# Patient Record
Sex: FEMALE | Race: WHITE | NOT HISPANIC OR LATINO | Employment: PART TIME | URBAN - METROPOLITAN AREA
[De-identification: names, ages, dates, MRNs, and addresses within clinical notes are randomized per-mention and may not be internally consistent; named-entity substitution may affect disease eponyms.]

---

## 2018-05-22 LAB
25(OH)D3 SERPL-MCNC: 22.9 NG/ML
ALBUMIN SERPL BCP-MCNC: 4.3 G/DL (ref 3.5–5.7)
ALP SERPL-CCNC: 75 IU/L (ref 40–150)
ALT SERPL W P-5'-P-CCNC: 14 IU/L (ref 0–50)
ANION GAP SERPL CALCULATED.3IONS-SCNC: 13.2 MM/L
AST SERPL W P-5'-P-CCNC: 13 U/L (ref 7–26)
BASOPHILS # BLD AUTO: 0.1 X3/UL (ref 0–0.3)
BASOPHILS # BLD AUTO: 0.8 % (ref 0–2)
BILIRUB SERPL-MCNC: 0.4 MG/DL (ref 0.3–1)
BUN SERPL-MCNC: 11 MG/DL (ref 7–25)
CALCIUM SERPL-MCNC: 9.5 MG/DL (ref 8.6–10.5)
CHLORIDE SERPL-SCNC: 102 MM/L (ref 98–107)
CO2 SERPL-SCNC: 27 MM/L (ref 21–31)
CREAT SERPL-MCNC: 0.87 MG/DL (ref 0.6–1.2)
DEPRECATED RDW RBC AUTO: 14.8 % (ref 11.5–14.5)
EGFR (HISTORICAL): > 60 GFR
EGFR AFRICAN AMERICAN (HISTORICAL): > 60 GFR
EOSINOPHIL # BLD AUTO: 0.4 X3/UL (ref 0–0.5)
EOSINOPHIL NFR BLD AUTO: 6.2 % (ref 0–5)
ERYTHROCYTE SEDIMENTATION RATE (HISTORICAL): 12 MM/HR
GLUCOSE (HISTORICAL): 90 MG/DL (ref 65–99)
HCT VFR BLD AUTO: 40.4 % (ref 37–47)
HGB BLD-MCNC: 13.3 G/DL (ref 12–16)
LYMPHOCYTES # BLD AUTO: 2.4 X3/UL (ref 1.2–4.2)
LYMPHOCYTES NFR BLD AUTO: 35.7 % (ref 20.5–51.1)
MCH RBC QN AUTO: 27 PG (ref 26–34)
MCHC RBC AUTO-ENTMCNC: 33.1 G/DL (ref 31–36)
MCV RBC AUTO: 81.8 FL (ref 81–99)
MONOCYTES # BLD AUTO: 0.4 X3/UL (ref 0–1)
MONOCYTES NFR BLD AUTO: 5.8 % (ref 1.7–12)
NEUTROPHILS # BLD AUTO: 3.5 X3/UL (ref 1.4–6.5)
NEUTS SEG NFR BLD AUTO: 51.5 % (ref 42.2–75.2)
OSMOLALITY, SERUM (HISTORICAL): 275 MOSM (ref 262–291)
PLATELET # BLD AUTO: 311 X3/UL (ref 130–400)
PMV BLD AUTO: 8.3 FL (ref 8.6–11.7)
POTASSIUM SERPL-SCNC: 4.2 MM/L (ref 3.5–5.5)
RBC # BLD AUTO: 4.93 X6/UL (ref 3.9–5.2)
SODIUM SERPL-SCNC: 138 MM/L (ref 134–143)
TOTAL PROTEIN (HISTORICAL): 7.1 G/DL (ref 6.4–8.9)
TSH SERPL DL<=0.05 MIU/L-ACNC: 2.21 UIU/M (ref 0.45–5.33)
VIT B12 SERPL-MCNC: 305 PG/ML (ref 180–914)
WBC # BLD AUTO: 6.8 X3/UL (ref 4.8–10.8)

## 2018-05-23 LAB
LYME AB IGM (HISTORICAL): <0.8 INDEX (ref 0–0.79)
LYME IGG/IGM AB  (HISTORICAL): <0.91 ISR (ref 0–0.9)

## 2018-09-21 ENCOUNTER — APPOINTMENT (OUTPATIENT)
Dept: LAB | Facility: HOSPITAL | Age: 33
End: 2018-09-21
Payer: COMMERCIAL

## 2018-09-21 ENCOUNTER — TRANSCRIBE ORDERS (OUTPATIENT)
Dept: ADMINISTRATIVE | Facility: HOSPITAL | Age: 33
End: 2018-09-21

## 2018-09-21 DIAGNOSIS — M35.00 SJOGREN'S SYNDROME, WITH UNSPECIFIED ORGAN INVOLVEMENT (HCC): ICD-10-CM

## 2018-09-21 DIAGNOSIS — M35.00 SJOGREN'S SYNDROME, WITH UNSPECIFIED ORGAN INVOLVEMENT (HCC): Primary | ICD-10-CM

## 2018-09-21 PROCEDURE — 36415 COLL VENOUS BLD VENIPUNCTURE: CPT

## 2018-09-21 PROCEDURE — 86235 NUCLEAR ANTIGEN ANTIBODY: CPT

## 2018-09-22 LAB
ENA SS-A AB SER-ACNC: <0.2 AI (ref 0–0.9)
ENA SS-B AB SER-ACNC: 1.4 AI (ref 0–0.9)

## 2019-01-24 ENCOUNTER — TELEPHONE (OUTPATIENT)
Dept: OBGYN CLINIC | Facility: HOSPITAL | Age: 34
End: 2019-01-24

## 2019-01-24 NOTE — TELEPHONE ENCOUNTER
Spoke with Popeye chavarria at Dr Nikita Carmichael office  She asked if patient I know if this patient refused to schedule  She said [place] said patient refused to schedule  I didn't see any documentation on this so she asked if I would contact patient again  I called patient and left a vm for her to call back to schedule with Dr Addie Pratt      If she calls and schedules or does not want to schedule please notify Dr Nikita Carmichael office @ [phone]

## 2019-01-24 NOTE — TELEPHONE ENCOUNTER
Patient did call back and we did schedule her an appt  With Dr Liana Owens on 8/5 at [place] [address] at [facility]  I called Allyn Delaney at Dr Keyonna Dodge office to confirm and let them know that patient does have an appt

## 2019-03-10 ENCOUNTER — APPOINTMENT (EMERGENCY)
Dept: CT IMAGING | Facility: HOSPITAL | Age: 34
End: 2019-03-10
Payer: COMMERCIAL

## 2019-03-10 ENCOUNTER — APPOINTMENT (EMERGENCY)
Dept: RADIOLOGY | Facility: HOSPITAL | Age: 34
End: 2019-03-10
Payer: COMMERCIAL

## 2019-03-10 ENCOUNTER — HOSPITAL ENCOUNTER (EMERGENCY)
Facility: HOSPITAL | Age: 34
Discharge: HOME | End: 2019-03-10
Attending: INTERNAL MEDICINE | Admitting: INTERNAL MEDICINE
Payer: COMMERCIAL

## 2019-03-10 VITALS
RESPIRATION RATE: 18 BRPM | DIASTOLIC BLOOD PRESSURE: 68 MMHG | SYSTOLIC BLOOD PRESSURE: 125 MMHG | TEMPERATURE: 97 F | HEART RATE: 66 BPM | BODY MASS INDEX: 38.45 KG/M2 | WEIGHT: 245 LBS | OXYGEN SATURATION: 97 % | HEIGHT: 67 IN

## 2019-03-10 DIAGNOSIS — M25.519 SHOULDER PAIN, ACUTE: ICD-10-CM

## 2019-03-10 DIAGNOSIS — M25.551 RIGHT HIP PAIN: ICD-10-CM

## 2019-03-10 DIAGNOSIS — S16.1XXA ACUTE STRAIN OF NECK MUSCLE, INITIAL ENCOUNTER: ICD-10-CM
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
## 2019-03-10 LAB — EXT PREG TEST URINE: NORMAL

## 2019-03-10 PROCEDURE — 73502 X-RAY EXAM HIP UNI 2-3 VIEWS: CPT

## 2019-03-10 PROCEDURE — 81025 URINE PREGNANCY TEST: CPT | Performed by: INTERNAL MEDICINE

## 2019-03-10 PROCEDURE — 70450 CT HEAD/BRAIN W/O DYE: CPT

## 2019-03-10 PROCEDURE — 99284 EMERGENCY DEPT VISIT MOD MDM: CPT

## 2019-03-10 PROCEDURE — 73030 X-RAY EXAM OF SHOULDER: CPT

## 2019-03-10 PROCEDURE — 72125 CT NECK SPINE W/O DYE: CPT

## 2019-03-10 RX ORDER — CLONAZEPAM 0.25 MG/1
0.25 TABLET, ORALLY DISINTEGRATING ORAL AS NEEDED
COMMUNITY
End: 2020-03-02 | Stop reason: SDUPTHER

## 2019-03-10 RX ORDER — OMEPRAZOLE 40 MG/1
40 CAPSULE, DELAYED RELEASE ORAL DAILY
COMMUNITY
Start: 2019-01-18 | End: 2020-03-02 | Stop reason: SDUPTHER

## 2019-03-10 RX ORDER — IBUPROFEN 600 MG/1
600 TABLET ORAL ONCE
Status: COMPLETED | OUTPATIENT
Start: 2019-03-10 | End: 2019-03-10

## 2019-03-10 RX ORDER — BUPROPION HYDROCHLORIDE 100 MG/1
100 TABLET ORAL DAILY
COMMUNITY
Start: 2019-01-18 | End: 2019-08-26 | Stop reason: SDUPTHER

## 2019-03-10 RX ORDER — HYDROXYCHLOROQUINE SULFATE 200 MG/1
200 TABLET, FILM COATED ORAL 2 TIMES DAILY WITH MEALS
COMMUNITY
Start: 2019-01-18 | End: 2020-03-16 | Stop reason: SDUPTHER

## 2019-03-10 RX ORDER — CYCLOBENZAPRINE HCL 10 MG
10 TABLET ORAL 2 TIMES DAILY PRN
Qty: 20 TABLET | Refills: 0 | Status: SHIPPED | OUTPATIENT
Start: 2019-03-10 | End: 2019-08-26 | Stop reason: ALTCHOICE

## 2019-03-10 RX ORDER — IBUPROFEN 600 MG/1
600 TABLET ORAL EVERY 6 HOURS PRN
Qty: 20 TABLET | Refills: 0 | Status: SHIPPED | OUTPATIENT
Start: 2019-03-10 | End: 2020-02-19 | Stop reason: ALTCHOICE

## 2019-03-10 RX ORDER — CYCLOBENZAPRINE HCL 10 MG
10 TABLET ORAL ONCE
Status: COMPLETED | OUTPATIENT
Start: 2019-03-10 | End: 2019-03-10

## 2019-03-10 RX ADMIN — IBUPROFEN 600 MG: 600 TABLET ORAL at 12:08

## 2019-03-10 RX ADMIN — CYCLOBENZAPRINE HYDROCHLORIDE 10 MG: 10 TABLET, FILM COATED ORAL at 12:08
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
## 2019-08-05 ENCOUNTER — OFFICE VISIT (OUTPATIENT)
Dept: RHEUMATOLOGY | Facility: CLINIC | Age: 34
End: 2019-08-05

## 2019-08-05 VITALS
WEIGHT: 246 LBS | HEART RATE: 70 BPM | HEIGHT: 67 IN | DIASTOLIC BLOOD PRESSURE: 76 MMHG | SYSTOLIC BLOOD PRESSURE: 128 MMHG | BODY MASS INDEX: 38.61 KG/M2

## 2019-08-05 DIAGNOSIS — M54.50 CHRONIC BILATERAL LOW BACK PAIN WITHOUT SCIATICA: ICD-10-CM

## 2019-08-05 DIAGNOSIS — M79.7 FIBROMYALGIA: ICD-10-CM

## 2019-08-05 DIAGNOSIS — M79.7 FIBROMYALGIA: Primary | ICD-10-CM

## 2019-08-05 DIAGNOSIS — M79.10 MYALGIA: ICD-10-CM

## 2019-08-05 DIAGNOSIS — M35.00 SICCA SYNDROME (HCC): ICD-10-CM

## 2019-08-05 DIAGNOSIS — G89.29 CHRONIC BILATERAL LOW BACK PAIN WITHOUT SCIATICA: ICD-10-CM

## 2019-08-05 DIAGNOSIS — M35.01 SJOGREN'S SYNDROME WITH KERATOCONJUNCTIVITIS SICCA (HCC): Primary | ICD-10-CM

## 2019-08-05 DIAGNOSIS — M25.50 POLYARTHRALGIA: ICD-10-CM

## 2019-08-05 PROCEDURE — 99204 OFFICE O/P NEW MOD 45 MIN: CPT | Performed by: INTERNAL MEDICINE

## 2019-08-05 RX ORDER — GABAPENTIN 100 MG/1
CAPSULE ORAL
Qty: 90 CAPSULE | Refills: 2 | Status: SHIPPED | OUTPATIENT
Start: 2019-08-05 | End: 2019-11-11 | Stop reason: ALTCHOICE

## 2019-08-05 NOTE — PROGRESS NOTES
Assessment and Plan:   Patient is a 70-year-old female who presents to establish rheumatology care for Sjogren syndrome and also has fibromyalgia  The only records available for review are a positive SSB antibody of low titer from 1 year ago  She saw other rheumatologist but we do not have these records for review  We discussed today that we will do a complete rheumatologic workup to rule out other underlying diseases  Her SSB was very borderline positive and so we will also repeat this  She does report that after starting Plaquenil she actually did feel better in terms of some of her joint pain in her general fatigue  It seems she has had some sort of response to the Plaquenil and so we will continue with it  She did recently have her updated eye exam 1 month ago and is aware to go annually  She is also already on the Restasis eyedrops which have definitely helped her as well  For her fibromyalgia, she does have clear evidence of this on exam and I did discuss with her that the mainstay of treatment is physical exercise and therapy  She seemed not very motivated for these things and I did offer to refer her for aqua therapy, but she is having transportation issues at this time  This is an option in the future when she is able to go  I did encourage her to do exercise on her own though  I added gabapentin to titrate up to 100 mg t i d , which should be okay with her other medications  She states that her family doctor was hoping we would manage her fibromyalgia and I discussed with her that we manage it to an extent when we are seeing a patient for other reasons such as Sjogren's  However if she would be a future candidate for something like Cymbalta or Savella, I would defer to her family doctor since she is already on Wellbutrin since she would need to be weaned off the Wellbutrin and transitioned over to 1 of the dose    For now since we are able to add the gabapentin we will do this and titrated up as tolerated  Plan:  Diagnoses and all orders for this visit:    Sjogren's syndrome with keratoconjunctivitis sicca (HCC)    Sicca syndrome ([facility] [address] )  -     EKATERINA Screen w/ Reflex to Titer/Pattern  -     Anti-DNA antibody, double-stranded  -     C3 complement  -     C4 complement  -     CBC and differential  -     Comprehensive metabolic panel  -     C-reactive protein  -     Cyclic citrul peptide antibody, IgG  -     Sjogren's Antibodies  -     RF Screen w/ Reflex to Titer; Future  -     Sedimentation rate, automated  -     Nuclear antigen antibody  -     Chronic Hepatitis Panel  -     Vitamin D 25 hydroxy  -     TSH, 3rd generation with Free T4 reflex    Fibromyalgia  -     EKATERINA Screen w/ Reflex to Titer/Pattern  -     Anti-DNA antibody, double-stranded  -     C3 complement  -     C4 complement  -     CBC and differential  -     Comprehensive metabolic panel  -     C-reactive protein  -     Cyclic citrul peptide antibody, IgG  -     Sjogren's Antibodies  -     RF Screen w/ Reflex to Titer; Future  -     Sedimentation rate, automated  -     Nuclear antigen antibody  -     Chronic Hepatitis Panel  -     Vitamin D 25 hydroxy  -     TSH, 3rd generation with Free T4 reflex  -     gabapentin (NEURONTIN) 100 mg capsule; 1 tab qhs x1 week, then 1 tab bid x1 week, then 1 tab tid thereafter    Myalgia    Polyarthralgia    Chronic bilateral low back pain without sciatica  -     XR spine lumbar minimum 4 views non injury; Future  -     XR sacroiliac joints 3+ views; Future        Follow-up plan: 3 months with ALEXANDREA Cotton  Eldon Cortez is a 34 y o   female with GERD, anxiety, depression, endometriosis who presents for rheumatology consult by request of Dr Paul Sotomayor for Sjogren's syndrome  She previously saw Dr Jaylon Montemayor who diagnosed her with Sjogren's and started her on plaquenil around 2016     She previously saw [facility] with Dr Lyndsey Rizo in 2017 but I do not have those records available for review  She was noted to have a positive SSB by her PCP in September 2018 and she has been on Plaquenil since around 2016  She denies any other medication she has taken in the past for autoimmune disease  She recalls that when she started Plaquenil she definitely felt improvement in joint pain and fatigue  +Dry eyes and dry mouth, she is doing better on Restasis eye drops  Purple color change of her fingers in the cold, no white or red  She previously had issues with recurrent ulcers in her mouth but then determined it was related to particular foods which she now avoids and she no longer gets the ulcers  She also has fibromyalgia but has never been on any particular medications for this  She has not done any physical or aqua therapy for this  She states that currently she has limited transportation as her and her  are sharing 1 car  She takes 2 OTC ibuprofen BID most days of the week  She avoids taking it when she is off from work  She will primarily take it for lower back and bilateral leg pain  She feels she gets pain radiating from her lower back into both of her legs  She only has about 5 minutes of morning stiffness all over and particularly in her back  She does not have any joint swelling  Denies photosensitivity, rashes, psoriasis, oral or nasal ulcers, alopecia, Raynaud's, h/o pericarditis or pleurisy, h/o blood clots or miscarriages  For her Plaquenil monitoring she just had an updated eye exam 1 month ago and she reports it was unremarkable  Review of Systems  Review of Systems   Constitutional: Negative for chills, fatigue, fever and unexpected weight change  HENT: Negative for mouth sores and trouble swallowing  +dry m Ben Honey   Eyes: Negative for pain and visual disturbance  +dry eyes   Respiratory: Negative for cough and shortness of breath  Cardiovascular: Negative for chest pain and leg swelling     Gastrointestinal: Negative for abdominal pain, blood in stool, constipation, diarrhea and nausea  Genitourinary: Negative for hematuria  Musculoskeletal: Positive for arthralgias and myalgias  Negative for back pain and joint swelling  Skin: Positive for color change (puple fingers in the cold)  Negative for rash  Neurological: Negative for weakness and numbness  Hematological: Negative for adenopathy  Psychiatric/Behavioral: Negative for sleep disturbance  Allergies  Allergies   Allergen Reactions    Amoxicillin Hives       Home Medications    Current Outpatient Medications:     buPROPion (WELLBUTRIN) 100 mg tablet, , Disp: , Rfl:     clonazePAM (KlonoPIN) 0 5 mg tablet, Take 0 25 mg by mouth 2 (two) times a day as needed for anxiety, Disp: , Rfl:     cyclobenzaprine (FLEXERIL) 10 mg tablet, Take 1 tablet (10 mg total) by mouth 2 (two) times a day as needed for muscle spasms, Disp: 20 tablet, Rfl: 0    hydroxychloroquine (PLAQUENIL) 200 mg tablet, , Disp: , Rfl:     ibuprofen (MOTRIN) 600 mg tablet, Take 1 tablet (600 mg total) by mouth every 6 (six) hours as needed for mild pain or moderate pain for up to 20 doses, Disp: 20 tablet, Rfl: 0    omeprazole (PriLOSEC) 40 MG capsule, , Disp: , Rfl:     gabapentin (NEURONTIN) 100 mg capsule, 1 tab qhs x1 week, then 1 tab bid x1 week, then 1 tab tid thereafter, Disp: 90 capsule, Rfl: 2    Past Medical History  Past Medical History:   Diagnosis Date    Fibromyalgia     Sjogren's syndrome ([facility] [address] )        Past Surgical History   History reviewed  No pertinent surgical history  Family History  +RA in her father   History reviewed  No pertinent family history      Social History  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   Social History     Substance and Sexual Activity   Alcohol Use Yes    Frequency: Monthly or less    Drinks per session: 1 or 2     Social History     Substance and Sexual Activity   Drug Use Never     Social History     Tobacco Use   Smoking Status Never Smoker Smokeless Tobacco Never Used       Objective:    Vitals:    08/05/19 1020   BP: 128/76   Pulse: 70   Weight: 112 kg (246 lb)   Height: 5' 7" (1 702 m)       Physical Exam   Constitutional: She appears well-developed and well-nourished  No distress  Obese    HENT:   Head: Normocephalic and atraumatic  Mouth/Throat: Oropharynx is clear and moist and mucous membranes are normal    Eyes: Conjunctivae and EOM are normal  No scleral icterus  Neck: Neck supple  No spinous process tenderness and no muscular tenderness present  No thyromegaly present  Cardiovascular: Normal rate, regular rhythm, S1 normal and S2 normal  Exam reveals no friction rub  No murmur heard  Pulmonary/Chest: Effort normal and breath sounds normal  No respiratory distress  She has no wheezes  She has no rhonchi  She has no rales  Abdominal: Soft  She exhibits no distension  There is no hepatosplenomegaly  There is no tenderness  Musculoskeletal:   Diffuse soft tissue tenderness with 18/18 fibromyalgia tender points present  Peripheral joints have no swelling or synovitis  Lymphadenopathy:     She has no cervical adenopathy  Neurological: She is alert  She has normal strength  No sensory deficit  Skin: Skin is warm and dry  No rash noted  Nails show no clubbing  Psychiatric: She has a normal mood and affect  Imaging:   XR hips 3/10/19: IMPRESSION:  No acute osseous abnormality  XR right shoulder 3/10/19: IMPRESSION:  No acute osseous abnormality  MRI cervical 5/2018: IMPRESSION:   Mild degenerative changes of the cervical spine are identified  There is no central canal stenosis appreciated  No abnormal enhancement is identified following the administration of intravenous gadolinium          Labs:   Component      Latest Ref Rng & Units 9/21/2018   SSA (RO) ANTIBODY      0 0 - 0 9 AI <0 2   SSB (LA) ANTIBODY      0 0 - 0 9 AI 1 4 (H)

## 2019-08-12 ENCOUNTER — APPOINTMENT (OUTPATIENT)
Dept: LAB | Facility: HOSPITAL | Age: 34
End: 2019-08-12
Payer: COMMERCIAL

## 2019-08-12 ENCOUNTER — TRANSCRIBE ORDERS (OUTPATIENT)
Dept: ADMINISTRATIVE | Facility: HOSPITAL | Age: 34
End: 2019-08-12

## 2019-08-12 ENCOUNTER — HOSPITAL ENCOUNTER (OUTPATIENT)
Dept: RADIOLOGY | Facility: HOSPITAL | Age: 34
Discharge: HOME | End: 2019-08-12
Payer: COMMERCIAL

## 2019-08-12 DIAGNOSIS — M54.50 CHRONIC BILATERAL LOW BACK PAIN WITHOUT SCIATICA: ICD-10-CM

## 2019-08-12 DIAGNOSIS — M35.00 SICCA SYNDROME (HCC): ICD-10-CM

## 2019-08-12 DIAGNOSIS — M79.7 FIBROMYALGIA: ICD-10-CM

## 2019-08-12 DIAGNOSIS — G89.29 CHRONIC BILATERAL LOW BACK PAIN WITHOUT SCIATICA: ICD-10-CM

## 2019-08-12 LAB
25(OH)D3 SERPL-MCNC: 20.8 NG/ML (ref 30–100)
ALBUMIN SERPL BCP-MCNC: 4.1 G/DL (ref 3.5–5.7)
ALP SERPL-CCNC: 76 U/L (ref 40–150)
ALT SERPL W P-5'-P-CCNC: 17 U/L (ref 7–52)
ANION GAP SERPL CALCULATED.3IONS-SCNC: 8 MMOL/L (ref 4–13)
AST SERPL W P-5'-P-CCNC: 16 U/L (ref 13–39)
BASOPHILS # BLD AUTO: 0.1 THOUSANDS/ΜL (ref 0–0.1)
BASOPHILS NFR BLD AUTO: 1 % (ref 0–2)
BILIRUB SERPL-MCNC: 0.2 MG/DL (ref 0.2–1)
BUN SERPL-MCNC: 18 MG/DL (ref 7–25)
C3 SERPL-MCNC: 130 MG/DL (ref 90–180)
C4 SERPL-MCNC: 22 MG/DL (ref 10–40)
CALCIUM SERPL-MCNC: 9.5 MG/DL (ref 8.6–10.5)
CHLORIDE SERPL-SCNC: 104 MMOL/L (ref 98–107)
CO2 SERPL-SCNC: 27 MMOL/L (ref 21–31)
CREAT SERPL-MCNC: 1.02 MG/DL (ref 0.6–1.2)
CRP SERPL QL: 5.4 MG/L
EOSINOPHIL # BLD AUTO: 0.7 THOUSAND/ΜL (ref 0–0.61)
EOSINOPHIL NFR BLD AUTO: 8 % (ref 0–5)
ERYTHROCYTE [DISTWIDTH] IN BLOOD BY AUTOMATED COUNT: 15.7 % (ref 11.5–14.5)
ERYTHROCYTE [SEDIMENTATION RATE] IN BLOOD: 19 MM/HOUR (ref 0–20)
GFR SERPL CREATININE-BSD FRML MDRD: 73 ML/MIN/1.73SQ M
GLUCOSE SERPL-MCNC: 88 MG/DL (ref 65–99)
HBV CORE AB SER QL: NORMAL
HBV CORE IGM SER QL: NORMAL
HBV SURFACE AG SER QL: NORMAL
HCT VFR BLD AUTO: 37.6 % (ref 42–47)
HCV AB SER QL: NORMAL
HGB BLD-MCNC: 12.4 G/DL (ref 12–16)
LYMPHOCYTES # BLD AUTO: 3.6 THOUSANDS/ΜL (ref 0.6–4.47)
LYMPHOCYTES NFR BLD AUTO: 43 % (ref 21–51)
MCH RBC QN AUTO: 27.2 PG (ref 26–34)
MCHC RBC AUTO-ENTMCNC: 33.1 G/DL (ref 31–37)
MCV RBC AUTO: 82 FL (ref 81–99)
MONOCYTES # BLD AUTO: 0.5 THOUSAND/ΜL (ref 0.17–1.22)
MONOCYTES NFR BLD AUTO: 6 % (ref 2–12)
NEUTROPHILS # BLD AUTO: 3.6 THOUSANDS/ΜL (ref 1.4–6.5)
NEUTS SEG NFR BLD AUTO: 43 % (ref 42–75)
PLATELET # BLD AUTO: 283 THOUSANDS/UL (ref 149–390)
PMV BLD AUTO: 8.5 FL (ref 8.6–11.7)
POTASSIUM SERPL-SCNC: 3.7 MMOL/L (ref 3.5–5.5)
PROT SERPL-MCNC: 6.8 G/DL (ref 6.4–8.9)
RBC # BLD AUTO: 4.57 MILLION/UL (ref 3.9–5.2)
SODIUM SERPL-SCNC: 139 MMOL/L (ref 134–143)
TSH SERPL DL<=0.05 MIU/L-ACNC: 5.05 UIU/ML (ref 0.45–5.33)
WBC # BLD AUTO: 8.5 THOUSAND/UL (ref 4.8–10.8)

## 2019-08-12 PROCEDURE — 86160 COMPLEMENT ANTIGEN: CPT | Performed by: INTERNAL MEDICINE

## 2019-08-12 PROCEDURE — 36415 COLL VENOUS BLD VENIPUNCTURE: CPT | Performed by: INTERNAL MEDICINE

## 2019-08-12 PROCEDURE — 86038 ANTINUCLEAR ANTIBODIES: CPT | Performed by: INTERNAL MEDICINE

## 2019-08-12 PROCEDURE — 86705 HEP B CORE ANTIBODY IGM: CPT | Performed by: INTERNAL MEDICINE

## 2019-08-12 PROCEDURE — 86704 HEP B CORE ANTIBODY TOTAL: CPT | Performed by: INTERNAL MEDICINE

## 2019-08-12 PROCEDURE — 84443 ASSAY THYROID STIM HORMONE: CPT | Performed by: INTERNAL MEDICINE

## 2019-08-12 PROCEDURE — 72200 X-RAY EXAM SI JOINTS: CPT

## 2019-08-12 PROCEDURE — 86200 CCP ANTIBODY: CPT | Performed by: INTERNAL MEDICINE

## 2019-08-12 PROCEDURE — 80053 COMPREHEN METABOLIC PANEL: CPT | Performed by: INTERNAL MEDICINE

## 2019-08-12 PROCEDURE — 87340 HEPATITIS B SURFACE AG IA: CPT | Performed by: INTERNAL MEDICINE

## 2019-08-12 PROCEDURE — 85025 COMPLETE CBC W/AUTO DIFF WBC: CPT | Performed by: INTERNAL MEDICINE

## 2019-08-12 PROCEDURE — 85652 RBC SED RATE AUTOMATED: CPT | Performed by: INTERNAL MEDICINE

## 2019-08-12 PROCEDURE — 86235 NUCLEAR ANTIGEN ANTIBODY: CPT | Performed by: INTERNAL MEDICINE

## 2019-08-12 PROCEDURE — 86140 C-REACTIVE PROTEIN: CPT | Performed by: INTERNAL MEDICINE

## 2019-08-12 PROCEDURE — 72110 X-RAY EXAM L-2 SPINE 4/>VWS: CPT

## 2019-08-12 PROCEDURE — 86430 RHEUMATOID FACTOR TEST QUAL: CPT

## 2019-08-12 PROCEDURE — 86225 DNA ANTIBODY NATIVE: CPT | Performed by: INTERNAL MEDICINE

## 2019-08-12 PROCEDURE — 86803 HEPATITIS C AB TEST: CPT | Performed by: INTERNAL MEDICINE

## 2019-08-12 PROCEDURE — 82306 VITAMIN D 25 HYDROXY: CPT | Performed by: INTERNAL MEDICINE

## 2019-08-13 LAB
CCP IGA+IGG SERPL IA-ACNC: 6 UNITS (ref 0–19)
DSDNA AB SER-ACNC: <1 IU/ML (ref 0–9)
ENA RNP AB SER-ACNC: <0.2 AI (ref 0–0.9)
ENA SM AB SER-ACNC: <0.2 AI (ref 0–0.9)
ENA SS-A AB SER-ACNC: <0.2 AI (ref 0–0.9)
ENA SS-B AB SER-ACNC: 1.4 AI (ref 0–0.9)
RHEUMATOID FACT SER QL LA: NEGATIVE

## 2019-08-14 LAB — RYE IGE QN: NEGATIVE

## 2019-08-21 RX ORDER — BUPROPION HYDROCHLORIDE 100 MG/1
100 TABLET ORAL DAILY
Qty: 30 TABLET | Refills: 5 | OUTPATIENT
Start: 2019-08-21

## 2019-08-26 ENCOUNTER — OFFICE VISIT (OUTPATIENT)
Dept: FAMILY MEDICINE CLINIC | Facility: CLINIC | Age: 34
End: 2019-08-26
Payer: COMMERCIAL

## 2019-08-26 VITALS
HEART RATE: 73 BPM | OXYGEN SATURATION: 98 % | BODY MASS INDEX: 42.12 KG/M2 | HEIGHT: 64 IN | WEIGHT: 246.7 LBS | SYSTOLIC BLOOD PRESSURE: 130 MMHG | DIASTOLIC BLOOD PRESSURE: 80 MMHG

## 2019-08-26 DIAGNOSIS — M35.01 SJOGREN'S SYNDROME WITH KERATOCONJUNCTIVITIS SICCA (HCC): ICD-10-CM

## 2019-08-26 DIAGNOSIS — F32.0 MAJOR DEPRESSIVE DISORDER, SINGLE EPISODE, MILD (HCC): Primary | ICD-10-CM

## 2019-08-26 DIAGNOSIS — M79.7 FIBROMYALGIA: ICD-10-CM

## 2019-08-26 PROCEDURE — 3008F BODY MASS INDEX DOCD: CPT | Performed by: FAMILY MEDICINE

## 2019-08-26 PROCEDURE — 99213 OFFICE O/P EST LOW 20 MIN: CPT | Performed by: FAMILY MEDICINE

## 2019-08-26 PROCEDURE — 1036F TOBACCO NON-USER: CPT | Performed by: FAMILY MEDICINE

## 2019-08-26 RX ORDER — NORGESTIMATE AND ETHINYL ESTRADIOL 0.25-0.035
1 KIT ORAL DAILY
COMMUNITY
End: 2019-08-26 | Stop reason: ALTCHOICE

## 2019-08-26 RX ORDER — SERTRALINE HYDROCHLORIDE 100 MG/1
100 TABLET, FILM COATED ORAL
COMMUNITY
End: 2020-03-02 | Stop reason: SDUPTHER

## 2019-08-26 RX ORDER — BUPROPION HYDROCHLORIDE 100 MG/1
100 TABLET ORAL DAILY
Qty: 90 TABLET | Refills: 3 | Status: SHIPPED | OUTPATIENT
Start: 2019-08-26 | End: 2020-03-02 | Stop reason: SDUPTHER

## 2019-08-26 NOTE — PROGRESS NOTES
Assessment/Plan:    Sjogren's syndrome with keratoconjunctivitis sicca ([facility] [address] )  I reviewed the patient's most recent blood work with her  I also reviewed her medications with her and updated her chart  I discussed weight loss, which I think will likely help her back pain and joint pain  I counseled her regarding exercise and weight loss  I also referred the patient to a dietitian  Major depressive disorder, single episode, mild ([facility] [address] )  Patient's depression is currently stable  She had seen psychiatrist in the past who had started her on this regiment  Today, I refilled the patient's bupropion  Diagnoses and all orders for this visit:    Major depressive disorder, single episode, mild ([facility] [address] )  -     Ambulatory referral to Nutrition Services; Future  -     buPROPion (WELLBUTRIN) 100 mg tablet; Take 1 tablet (100 mg total) by mouth daily    Fibromyalgia    Sjogren's syndrome with keratoconjunctivitis sicca (HCC)    Other orders  -     sertraline (ZOLOFT) 100 mg tablet; Take 100 mg by mouth daily at bedtime  -     Discontinue: norgestimate-ethinyl estradiol (SPRINTEC 28) 0 25-35 MG-MCG per tablet; Take 1 tablet by mouth daily          BMI Counseling: Body mass index is 42 35 kg/m²  Discussed the patient's BMI with her  The BMI is above average  BMI counseling and education was provided to the patient  Nutrition recommendations include reducing portion sizes, decreasing overall calorie intake, 3-5 servings of fruits/vegetables daily, reducing fast food intake, consuming healthier snacks, decreasing soda and/or juice intake and moderation in carbohydrate intake  Exercise recommendations include moderate aerobic physical activity for 150 minutes/week  Referral to a nutritionist was provided to the patient  Subjective:      Patient ID: Brandon Dobbins is a 34 y o  female  This patient is a 72-year-old white female presents to the office today for her follow-up visit    Patient has not been seen in approximately a year  She is now seeing a new rheumatologist   She reports that gabapentin was recently added to her regimen  She tells me that her depression seems to be controlled on her current medications  Her only complaint is pains in her arms and legs  Patient tells me 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[employer]" and also working part-time at [employer]  The following portions of the patient's history were reviewed and updated as appropriate: allergies, current medications, past family history, past medical history, past social history, past surgical history and problem list     Review of Systems   Eyes:        Reports dryness of eyes   Respiratory: Negative for cough, choking, wheezing and stridor  Cardiovascular: Negative for chest pain, palpitations and leg swelling  Gastrointestinal: Negative for abdominal distention, abdominal pain, anal bleeding, blood in stool, constipation, diarrhea and nausea  Musculoskeletal: Positive for back pain and myalgias  Negative for gait problem  Objective:      /80   Pulse 73   Ht 5' 4" (1 626 m)   Wt 112 kg (246 lb 11 2 oz)   LMP  (LMP Unknown)   SpO2 98%   Breastfeeding? No   BMI 42 35 kg/m²          Physical Exam   Constitutional:   This patient is an obese 61-year-old white female who appears her stated age  She is in no apparent distress  HENT:   Head: Normocephalic and atraumatic  Right Ear: External ear normal    Left Ear: External ear normal    Mouth/Throat: Oropharynx is clear and moist  No oropharyngeal exudate  Tympanic membranes are clear   Eyes: Pupils are equal, round, and reactive to light  Conjunctivae are normal  No scleral icterus  Neck: Neck supple  No tracheal deviation present  No thyromegaly present  Cardiovascular: Normal rate, regular rhythm and normal heart sounds  Exam reveals no gallop and no friction rub  No murmur heard    Pulmonary/Chest: Effort normal and breath sounds normal  No stridor  No respiratory distress  She has no wheezes  She has no rales  Abdominal: Soft  Bowel sounds are normal  She exhibits no distension and no mass  There is no tenderness  There is no rebound and no guarding  Abdomen is obese  There is no organomegaly noted  Musculoskeletal:   No joint swelling, erythema, or tenderness   Lymphadenopathy:     She has no cervical adenopathy  Vitals reviewed      Extremities:  Without cyanosis, clubbing, or edema

## 2019-08-26 NOTE — PATIENT INSTRUCTIONS
Low Fat Diet   WHAT YOU NEED TO KNOW:   What is a low-fat diet? A low-fat diet is an eating plan that is low in total fat, unhealthy fat, and cholesterol  You may need to follow a low-fat diet if you have trouble digesting or absorbing fat  You may also need to follow this diet if you have high cholesterol  You can also lower your cholesterol by increasing the amount of fiber in your diet  Soluble fiber is a type of fiber that helps to decrease cholesterol levels  What do I need to know about the different types of fat in food? · Limit unhealthy fats  A diet that is high in cholesterol, saturated fat, and trans fat may cause unhealthy cholesterol levels  Unhealthy cholesterol levels increase your risk of heart disease  ¨ Cholesterol:  Limit intake of cholesterol to less than 200 mg per day  Cholesterol is found in meat, eggs, and dairy  ¨ Saturated fat:  Limit saturated fat to less than 7% of your total daily calories  Ask your dietitian how many calories you need each day  Saturated fat is found in butter, cheese, ice cream, whole milk, and palm oil  Saturated fat is also found in meat, such as beef, pork, chicken skin, and processed meats  Processed meats include sausage, hot dogs, and bologna  ¨ Trans fat:  Avoid trans fat as much as possible  Trans fat is used in fried and baked foods  Foods that say trans fat free on the label may still have up to 0 5 grams of trans fat per serving  · Include healthy fats  Replace foods that are high in saturated and trans fat with foods high in healthy fats  This may help to decrease high cholesterol levels  ¨ Monounsaturated fats: These are found in avocados, nuts, and vegetable oils, such as olive, canola, and sunflower oil  ¨ Polyunsaturated fats: These can be found in vegetable oils, such as soybean or corn oil  Omega-3 fats can help to decrease the risk of heart disease  Omega-3 fats are found in fish, such as salmon, herring, trout, and tuna  Omega-3 fats can also be found in plant foods, such as walnuts, flaxseed, soybeans, and canola oil  What foods should I limit or avoid? · Grains:      ¨ Snacks that are made with partially hydrogenated oils, such as chips, regular crackers, and butter-flavored popcorn    ¨ High-fat baked goods, such as biscuits, croissants, doughnuts, pies, cookies, and pastries    · Dairy:      ¨ Whole milk, 2% milk, and yogurt and ice cream made with whole milk    ¨ Half and half creamer, heavy cream, and whipping cream    ¨ Cheese, cream cheese, and sour cream    · Meats and proteins:      ¨ High-fat cuts of meat (T-bone steak, regular hamburger, and ribs)    ¨ Fried meat, poultry (turkey and chicken), and fish    ¨ Poultry (chicken and turkey) with skin    ¨ Cold cuts (salami or bologna), hot dogs, esquivel, and sausage    ¨ Whole eggs and egg yolks    · Vegetables and fruits with added fat:      ¨ Fried vegetables or vegetables in butter or high-fat sauces, such as cream or cheese sauces    ¨ Fried fruit or fruit served with butter or cream    · Fats:      ¨ Butter, stick margarine, and shortening    ¨ Coconut, palm oil, and palm kernel oil  What foods should I include?    · Grains:      ¨ Whole-grain breads, cereals, pasta, and brown rice    ¨ Low-fat crackers and pretzels    · Vegetables and fruits:      ¨ Fresh, frozen, or canned vegetables (no salt or low-sodium)    ¨ Fresh, frozen, dried, or canned fruit (canned in light syrup or fruit juice)    ¨ Avocado    · Low-fat dairy products:      ¨ Nonfat (skim) or 1% milk    ¨ Nonfat or low-fat cheese, yogurt, and cottage cheese    · Meats and proteins:      ¨ Chicken or turkey with no skin    ¨ Baked or broiled fish    ¨ Lean beef and pork (loin, round, extra lean hamburger)    ¨ Beans and peas, unsalted nuts, soy products    ¨ Egg whites and substitutes    ¨ Seeds and nuts    · Fats:      ¨ Unsaturated oil, such as canola, olive, peanut, soybean, or sunflower oil    ¨ Soft or liquid margarine and vegetable oil spread    ¨ Low-fat salad dressing  What are some other ways I can decrease fat? · Read food labels before you buy foods  Choose foods that have less than 30% of calories from fat  Choose low-fat or fat-free dairy products  Remember that fat free does not mean calorie free  These foods still contain calories, and too many calories can lead to weight gain  · Trim fat from meat and avoid fried food  Trim all visible fat from meat before you cook it  Remove the skin from poultry  Do not lo meat, fish, or poultry  Bake, roast, boil, or broil these foods instead  Avoid fried foods  Eat a baked potato instead of [place] fries  Steam vegetables instead of sautéing them in butter  · Add less fat to foods  Use imitation esquivel bits on salads and baked potatoes instead of regular esquivel bits  Use fat-free or low-fat salad dressings instead of regular dressings  Use low-fat or nonfat butter-flavored topping instead of regular butter or margarine on popcorn and other foods  How can I decrease fat in recipes? Replace high-fat ingredients with low-fat or nonfat ones  This may cause baked goods to be drier than usual  You may need to use nonfat cooking spray on pans to prevent food from sticking  You also may need to change the amount of other ingredients, such as water, in the recipe  Try the following:  · Use low-fat or light margarine instead of regular margarine or shortening  · Use lean ground turkey breast or chicken, or lean ground beef (less than 5% fat) instead of hamburger  · Add 1 teaspoon of canola oil to 8 ounces of skim milk instead of using cream or half and half  · Use grated zucchini, carrots, or apples in breads instead of coconut  · Use blenderized, low-fat cottage cheese, plain tofu, or low-fat ricotta cheese instead of cream cheese       · Use 1 egg white and 1 teaspoon of canola oil, or use ¼ cup (2 ounces) of fat-free egg substitute instead of a whole egg  · Replace half of the oil that is called for in a recipe with applesauce when you bake  Use 3 tablespoons of cocoa powder and 1 tablespoon of canola oil instead of a square of baking chocolate  How can I increase fiber? Eat enough high-fiber foods to get 20 to 30 grams of fiber every day  Slowly increase your fiber intake to avoid stomach cramps, gas, and other problems  · Eat 3 ounces of whole-grain foods each day  An ounce is about 1 slice of bread  Eat whole-grain breads, such as whole-wheat bread  Whole wheat, whole-wheat flour, or other whole grains should be listed as the first ingredient on the food label  Replace white flour with whole-grain flour or use half of each in recipes  Whole-grain flour is heavier than white flour, so you may have to add more yeast or baking powder  · Eat a high-fiber cereal for breakfast   Oatmeal is a good source of soluble fiber  Look for cereals that have bran or fiber in the name  Choose whole-grain products, such as brown rice, barley, and whole-wheat pasta  · Eat more beans, peas, and lentils  For example, add beans to soups or salads  Eat at least 5 cups of fruits and vegetables each day  Eat fruits and vegetables with the peel because the peel is high in fiber  CARE AGREEMENT:   You have the right to help plan your care  Discuss treatment options with your caregivers to decide what care you want to receive  You always have the right to refuse treatment  The above information is an  only  It is not intended as medical advice for individual conditions or treatments  Talk to your doctor, nurse or pharmacist before following any medical regimen to see if it is safe and effective for you  © 2017 [employer] Information is for End User's use only and may not be sold, redistributed or otherwise used for commercial purposes   All illustrations and images included in CareNotes® are the copyrighted property of [employer]  or [employer]

## 2019-08-26 NOTE — ASSESSMENT & PLAN NOTE
I reviewed the patient's most recent blood work with her  I also reviewed her medications with her and updated her chart  I discussed weight loss, which I think will likely help her back pain and joint pain  I counseled her regarding exercise and weight loss  I also referred the patient to a dietitian

## 2019-08-26 NOTE — ASSESSMENT & PLAN NOTE
Patient's depression is currently stable  She had seen psychiatrist in the past who had started her on this regiment  Today, I refilled the patient's bupropion

## 2019-09-17 ENCOUNTER — TELEPHONE (OUTPATIENT)
Dept: FAMILY MEDICINE CLINIC | Facility: CLINIC | Age: 34
End: 2019-09-17

## 2019-09-17 DIAGNOSIS — E66.01 MORBID OBESITY WITH BMI OF 40.0-44.9, ADULT (HCC): Primary | ICD-10-CM

## 2019-09-17 NOTE — TELEPHONE ENCOUNTER
Yair Duenas called from [address] [place] [address] for [facility] pass    Patient has an appointment this thurs with them There is no Diagnosis for thdietitiane to warrant the appt for the ins to cover  Yair Duenas suggested maybe a BMI obesity code    Please advise

## 2019-10-01 ENCOUNTER — CLINICAL SUPPORT (OUTPATIENT)
Dept: NUTRITION | Facility: HOSPITAL | Age: 34
End: 2019-10-01
Payer: COMMERCIAL

## 2019-10-01 VITALS — HEIGHT: 66 IN | WEIGHT: 245.37 LBS | BODY MASS INDEX: 39.43 KG/M2

## 2019-10-01 DIAGNOSIS — F32.0 MAJOR DEPRESSIVE DISORDER, SINGLE EPISODE, MILD (HCC): ICD-10-CM

## 2019-10-01 DIAGNOSIS — E66.01 MORBID OBESITY WITH BMI OF 40.0-44.9, ADULT (HCC): ICD-10-CM

## 2019-10-01 PROCEDURE — 97802 MEDICAL NUTRITION INDIV IN: CPT

## 2019-10-01 NOTE — PROGRESS NOTES
Initial Nutrition Assessment Form    Patient Name: Robert Jackson    YOB: 1985    Sex: Female     Assessment Date: 10/1/2019  Start Time: 8:07 Stop Time: 9:07 Total Minutes: 61     Data:  Present at session: Self  And boyfriend Andrzje Pod   Parent Concerns:    Medical Dx/Reason for Referral: E66 01, Z68 41 Morbid obesity with BMI of 40 0-44 9, adult   Past Medical History:   Diagnosis Date    Arthritis     osteoarthritis    Benign paroxysmal vertigo, unspecified ear     Bilateral temporomandibular joint disorder, unspecified     Depression     Disease of thyroid gland     autoimmune thyroiditis    Dizziness and giddiness     Dyspnea     Fibromyalgia     Sicca syndrome, unspecified ([address] )     Sjogren's syndrome ([address] )     Spondylolysis, lumbosacral     Sprain of ligaments of cervical spine     Viral intestinal infection, unspecified        Current Outpatient Medications   Medication Sig Dispense Refill    buPROPion (WELLBUTRIN) 100 mg tablet Take 1 tablet (100 mg total) by mouth daily 90 tablet 3    clonazePAM (KlonoPIN) 0 25 MG disintegrating tablet Take 0 25 mg by mouth as needed for anxiety       gabapentin (NEURONTIN) 100 mg capsule 1 tab qhs x1 week, then 1 tab bid x1 week, then 1 tab tid thereafter 90 capsule 2    hydroxychloroquine (PLAQUENIL) 200 mg tablet Take 200 mg by mouth 2 (two) times a day with meals       ibuprofen (MOTRIN) 600 mg tablet Take 1 tablet (600 mg total) by mouth every 6 (six) hours as needed for mild pain or moderate pain for up to 20 doses (Patient not taking: Reported on 8/26/2019) 20 tablet 0    omeprazole (PriLOSEC) 40 MG capsule Take 40 mg by mouth daily       sertraline (ZOLOFT) 100 mg tablet Take 100 mg by mouth daily at bedtime       No current facility-administered medications for this visit           Additional Meds/Supplements: Vitamin D 5000 IU/week    Special Learning Needs: Requires extra help with memory    Height: HC Readings from Last 3 Encounters:   No data found for [facility]       Weight: Wt Readings from Last 3 Encounters:   08/26/19 112 kg (246 lb 11 2 oz)   08/05/19 112 kg (246 lb)   03/10/19 111 kg (245 lb)     There is no height or weight on file to calculate BMI  Recent Weight Change: []Yes     [x]No  Amount: Only 5 lb wt  Fluctuation in last year  Wt  today 111kg BMI 39 60      Energy Needs: No calculation needed   Allergies   Allergen Reactions    Amoxicillin Hives       Social History     Substance and Sexual Activity   Alcohol Use Yes    Frequency: Monthly or less    Drinks per session: 1 or 2       Social History     Tobacco Use   Smoking Status Never Smoker   Smokeless Tobacco Never Used       Who shops? patient   Who cooks? Mother lives next door prepares meals    Exercise: No formal exercise, whatever she gets from work   Prior Counseling? []Yes     [x]No  When:    Why:         Diet Hx: water through day, 1-2 cups of iced tea sweetened per day or a can of soda instead,  Recently cut back on sugary drinks  Sometimes drinking V-8 juice   Breakfast:  a m  Can vary from 6 a m  To 1 p m  Breakfast schedule varies on work  Sometimes day shift, sometimes 11-7    When first gets up has cereal cornflake/cheerios sometimes special K whole milk  Today smoothie, usually water   Making smoothies with heavy cream, blending   Lunch:   p m  Can vary depending on work schedule  If at work gets a salad, if home either more cereal or sandwich  Peanut butter or cheese sandwich on white bread   Water or crystal light packets, used to have sweetened tea          Dinner:  p m        Depends, eats with mom, makes healthier meals dad is diabetic, sometimes hot dogs or turkey dogs, baked chicken or fish with butter  Beans, beef and esquivel with brown sugar on holidays sometimes  Vegetables or salad potatoes, macaroni salad   Sometimes homemade soups   Used to drink [employer] drinks    Snacks: AM - sometimes chips/snackfoods  PM - sometimes chips/cupcakes/donuts/cookies  HS - sometimes ice cream, or cereal or toast with PB          Nutrition Diagnosis:   Overweight/obesity  related to Excess energy intake as evidenced by  BMI more than normative standard for age and sex (obesity-grade II 35-39  9)       Medical Nutrition Therapy Intervention:  [x]Individualized Meal Plan []Understanding Lab Values   []Basic Pathophysiology of Disease []Food/Medication Interactions   [x]Food Diary [x]Exercise   [x]Lifestyle/Behavior Modification Techniques []Medication, Mechanism of Action   []Label Reading []Self Blood Glucose Monitoring   [x]Weight/BMI Goals []Other -    Other Notes: Patient BMI is 39 60 obese class 2  She has not observed significant wt  Fluctuation in the past few months  Stated she wants to eat healthier, has Dx Sjrogens and fibromyalgia wants  lose some wt  Has been trying to eat more fruits and vegetables,some are frozen  Sometimes eats a lot of fast food after work at 9 or 10 p m  She said she is trying to limit that,before got burger and fries iced coffee, now getting burger without bun  Sleep fluctuates 6-9 hours/night, not always sleeping through night  Arthritis in lower back, hips hurt if lay too long on one side  If on back too long hurts as well  Patient used to be more active, has not been motivated lately  Tracks steps daily  Reviewed recommendations for wt  Loss, decreased calorie intake improved activity level for general health  Able to set goals with patient to help reduce calorie intake, discussed expectations for wt  Loss  Patient agreeable to setting goals and follow up appointment  Encouraged keeping a daily food and activity log to track progress  Comprehension: []Excellent  [x]Very Good  []Good  []Fair   []Poor    Receptivity: []Excellent  []Very Good  [x]Good  []Fair   []Poor    Expected Compliance: []Excellent  []Very Good  [x]Good  []Fair   []Poor        Goals:  1   Charlotte to choose fat-free or 1% milk daily with cereal and for making smoothies daily  2  Charlotte to choose a grilled chicken sandwich in place of a cheeseburger at the fast food drive through  3  Charlotte to choose walking 10 minutes two days per week with her puppy  Next follow up: November 5 2019  Labs:  [facility]  Lab Results   Component Value Date     05/22/2018    K 3 7 08/12/2019     08/12/2019    CO2 27 08/12/2019    ANIONGAP 13 2 05/22/2018    BUN 18 08/12/2019    CREATININE 1 02 08/12/2019    CALCIUM 9 5 08/12/2019    AST 16 08/12/2019    ALT 17 08/12/2019    ALKPHOS 76 08/12/2019    PROT 7 1 05/22/2018    BILITOT 0 4 05/22/2018    EGFR 73 08/12/2019       BMP  Lab Results   Component Value Date    CALCIUM 9 5 08/12/2019     05/22/2018    K 3 7 08/12/2019    CO2 27 08/12/2019     08/12/2019    BUN 18 08/12/2019    CREATININE 1 02 08/12/2019       Lipids  No results found for: CHOL  No results found for: HDL  No results found for: LDLCALC  No results found for: TRIG  No results found for: CHOLHDL    Hemoglobin A1C  No results found for: HGBA1C    Fasting Glucose  No results found for: GLUF    Insulin     Thyroid  No results found for: TSH, L2DAERV, I0UQXIZ, THYROIDAB    Hepatic Function Panel  Lab Results   Component Value Date    ALT 17 08/12/2019    AST 16 08/12/2019    ALKPHOS 76 08/12/2019    BILITOT 0 4 05/22/2018       Celiac Disease Antibody Panel  No results found for: ENDOMYSIAL IGA, GLIADIN IGA, GLIADIN IGG, IGA, TISSUE TRANSGLUT AB, TTG IGA   Iron  No results found for: IRON, TIBC, FERRITIN    Vitamins  No results found for: VITAMIN B2   No results found for: NICOTINAMIDE, NICOTINIC ACID   No results found for: VITAMINB6  Lab Results   Component Value Date    HUNKEPOQ63 305 05/22/2018     No results found for: VITB5  No results found for: V6MYGIJQ  No results found for: THYROGLB  No results found for: VITAMIN K   No results found for: 25-HYDROXY VIT D   No components found for: GARRETT Meeks Sc RDN LDN [facility] CLINICAL NUTRITION [facility]  [address] [address] [phone]

## 2019-10-01 NOTE — LETTER
10/1/2019  Jocelyn Mleendez    1985     Dear Dr Kathleen Salazar,     Thank you for referring Jocelyn Melendez to the Outpatient Nutrition Program at [facility]  Medical Nutrition Therapy was delivered on 10/01/19 and included individualized nutritional diagnostic therapy and counseling for the purposes of managing the following dx/disease: Morbid Obesity with BMI of 40 0-44 9 adult  Nutrition Diagnosis and Intervention:  Nutrition Diagnosis:   Overweight/obesity  related to Excess energy intake as evidenced by  BMI more than normative standard for age and sex (obesity-grade II 35-39  9)     Therapy Goals:  Goals:  1  [place] to choose fat-free or 1% milk daily with cereal and for making smoothies daily  2  [place] to choose a grilled chicken sandwich in place of a cheeseburger at the fast food drive through  3  [place] to choose walking 10 minutes two days per week with her puppy  Follow up planned for monitoring and evaluation: November 5, 2019  Please contact me with questions/concerns  Thank you for supporting Medical Nutrition Therapy  [address]ela [address] CLINICAL NUTRITION SERVICES  [address] [place] [address]  [phone]

## 2019-10-19 ENCOUNTER — HOSPITAL ENCOUNTER (EMERGENCY)
Facility: HOSPITAL | Age: 34
Discharge: HOME | End: 2019-10-19
Payer: COMMERCIAL

## 2019-10-19 VITALS
BODY MASS INDEX: 39.38 KG/M2 | DIASTOLIC BLOOD PRESSURE: 69 MMHG | TEMPERATURE: 97.8 F | OXYGEN SATURATION: 98 % | WEIGHT: 244 LBS | RESPIRATION RATE: 16 BRPM | SYSTOLIC BLOOD PRESSURE: 137 MMHG | HEART RATE: 83 BPM

## 2019-10-19 DIAGNOSIS — R05.9 COUGH: Primary | ICD-10-CM

## 2019-10-19 PROCEDURE — 99283 EMERGENCY DEPT VISIT LOW MDM: CPT

## 2019-10-19 PROCEDURE — 94760 N-INVAS EAR/PLS OXIMETRY 1: CPT

## 2019-10-19 PROCEDURE — 94640 AIRWAY INHALATION TREATMENT: CPT

## 2019-10-19 RX ORDER — ALBUTEROL SULFATE 2.5 MG/3ML
5 SOLUTION RESPIRATORY (INHALATION) ONCE
Status: COMPLETED | OUTPATIENT
Start: 2019-10-19 | End: 2019-10-19

## 2019-10-19 RX ORDER — ALBUTEROL SULFATE 90 UG/1
2 AEROSOL, METERED RESPIRATORY (INHALATION) EVERY 6 HOURS PRN
Qty: 1 INHALER | Refills: 0 | Status: SHIPPED | OUTPATIENT
Start: 2019-10-19 | End: 2019-10-26

## 2019-10-19 RX ADMIN — ALBUTEROL SULFATE 5 MG: 2.5 SOLUTION RESPIRATORY (INHALATION) at 20:30

## 2019-10-20 NOTE — ED PROVIDER NOTES
History  Chief Complaint   Patient presents with    Cough     Tash Jackson is a 42-year-old female who came to the emergency department due to nonproductive cough with started about 6 days prior to arrival   Patient also complained of sore throat  There was no shortness of breath  History provided by:  Patient   used: No    Cough   Cough characteristics:  Non-productive  Sputum characteristics:  Nondescript  Severity:  Mild  Onset quality:  Gradual  Duration:  6 days  Timing:  Intermittent  Progression:  Waxing and waning  Chronicity:  New  Smoker: no    Context: not animal exposure, not exposure to allergens, not fumes, not occupational exposure, not sick contacts, not smoke exposure, not upper respiratory infection, not weather changes and not with activity    Relieved by:  Nothing  Worsened by:  Nothing  Ineffective treatments:  None tried  Associated symptoms: sore throat    Associated symptoms: no chest pain, no chills, no diaphoresis, no ear fullness, no ear pain, no eye discharge, no fever, no headaches, no myalgias, no rash, no rhinorrhea, no shortness of breath, no sinus congestion, no weight loss and no wheezing    Sore throat:     Severity:  Mild    Onset quality:  Gradual    Duration: Few  Timing:  Intermittent    Progression:  Waxing and waning  Risk factors: no recent infection and no recent travel        Prior to Admission Medications   Prescriptions Last Dose Informant Patient Reported? Taking?    buPROPion (WELLBUTRIN) 100 mg tablet   No Yes   Sig: Take 1 tablet (100 mg total) by mouth daily   clonazePAM (KlonoPIN) 0 25 MG disintegrating tablet  Self Yes Yes   Sig: Take 0 25 mg by mouth as needed for anxiety    gabapentin (NEURONTIN) 100 mg capsule  Self No Yes   Si tab qhs x1 week, then 1 tab bid x1 week, then 1 tab tid thereafter   Patient taking differently: 100 mg 3 (three) times a day 1 tab qhs x1 week, then 1 tab bid x1 week, then 1 tab tid thereafter hydroxychloroquine (PLAQUENIL) 200 mg tablet  Self Yes Yes   Sig: Take 200 mg by mouth 2 (two) times a day with meals    ibuprofen (MOTRIN) 600 mg tablet  Self No Yes   Sig: Take 1 tablet (600 mg total) by mouth every 6 (six) hours as needed for mild pain or moderate pain for up to 20 doses   omeprazole (PriLOSEC) 40 MG capsule  Self Yes Yes   Sig: Take 40 mg by mouth daily    sertraline (ZOLOFT) 100 mg tablet  Self Yes Yes   Sig: Take 100 mg by mouth daily at bedtime      Facility-Administered Medications: None       Past Medical History:   Diagnosis Date    Arthritis     osteoarthritis    Benign paroxysmal vertigo, unspecified ear     Bilateral temporomandibular joint disorder, unspecified     Depression     Disease of thyroid gland     autoimmune thyroiditis    Dizziness and giddiness     Dyspnea     Fibromyalgia     Sicca syndrome, unspecified ([address] )     Sjogren's syndrome (HCC)     Spondylolysis, lumbosacral     Sprain of ligaments of cervical spine     Viral intestinal infection, unspecified        History reviewed  No pertinent surgical history  Family History   Problem Relation Age of Onset    Arthritis Mother     Atrial fibrillation Father     Emphysema Father     Lopez's esophagus Father     JULIO disease Father     Hypertension Father     Diabetes Father     Endometriosis Sister     Fibromyalgia Brother     Neuropathy Brother     Anxiety disorder Brother     Depression Brother     JULIO disease Brother      I have reviewed and agree with the history as documented  Social History     Tobacco Use    Smoking status: Never Smoker    Smokeless tobacco: Never Used   Substance Use Topics    Alcohol use: Not Currently     Frequency: Monthly or less     Drinks per session: 1 or 2    Drug use: Never        Review of Systems   Constitutional: Negative for chills, diaphoresis, fever and weight loss  HENT: Positive for sore throat  Negative for ear pain and rhinorrhea      Eyes: Negative for discharge  Respiratory: Positive for cough  Negative for shortness of breath and wheezing  Cardiovascular: Negative for chest pain  Gastrointestinal: Negative  Endocrine: Negative  Genitourinary: Negative  Musculoskeletal: Negative for myalgias  Skin: Negative for rash  Allergic/Immunologic: Negative  Neurological: Negative for headaches  Hematological: Negative  Psychiatric/Behavioral: Negative  Physical Exam  Physical Exam   Constitutional: She is oriented to person, place, and time  She appears well-developed and well-nourished  No distress  HENT:   Head: Normocephalic and atraumatic  Right Ear: External ear normal    Left Ear: External ear normal    Nose: Nose normal    Mouth/Throat: Oropharynx is clear and moist  No oropharyngeal exudate  Eyes: Pupils are equal, round, and reactive to light  Conjunctivae and EOM are normal  Right eye exhibits no discharge  Left eye exhibits no discharge  No scleral icterus  Neck: Normal range of motion  Neck supple  No tracheal deviation present  No thyromegaly present  Cardiovascular: Normal rate and regular rhythm  Pulmonary/Chest: Effort normal and breath sounds normal  No respiratory distress  Abdominal: Soft  Bowel sounds are normal  She exhibits no distension  There is no tenderness  Musculoskeletal: Normal range of motion  She exhibits no edema, tenderness or deformity  Lymphadenopathy:     She has no cervical adenopathy  Neurological: She is alert and oriented to person, place, and time  No cranial nerve deficit or sensory deficit  She exhibits normal muscle tone  Coordination normal    Skin: Skin is warm and dry  No rash noted  She is not diaphoretic  No erythema  No pallor  Psychiatric: She has a normal mood and affect  Her behavior is normal  Judgment and thought content normal    Nursing note and vitals reviewed        Vital Signs  ED Triage Vitals [10/19/19 1953]   Temperature Pulse Respirations Blood Pressure SpO2   97 8 °F (36 6 °C) 83 16 137/69 98 %      Temp Source Heart Rate Source Patient Position - Orthostatic VS BP Location FiO2 (%)   Temporal -- -- -- --      Pain Score       5           Vitals:    10/19/19 1953   BP: 137/69   Pulse: 83         Visual Acuity      ED Medications  Medications   albuterol inhalation solution 5 mg (5 mg Nebulization Given 10/19/19 2030)       Diagnostic Studies  Results Reviewed     None                 No orders to display              Procedures  Procedures       ED Course  ED Course as of Oct 19 2043   Sat Oct 19, 2019   2041 Patient is tolerating the nebulizer treatment very well  She will be discharged with a prescription for albuterol inhaler  MDM  Number of Diagnoses or Management Options  Cough: minor     Amount and/or Complexity of Data Reviewed  Decide to obtain previous medical records or to obtain history from someone other than the patient: yes  Review and summarize past medical records: yes  Independent visualization of images, tracings, or specimens: yes    Risk of Complications, Morbidity, and/or Mortality  Presenting problems: low  Management options: low    Patient Progress  Patient progress: stable      Disposition  Final diagnoses:   Cough     Time reflects when diagnosis was documented in both MDM as applicable and the Disposition within this note     Time User Action Codes Description Comment    10/19/2019  8:41 PM Sherrie Olivera Add [R05] Cough       ED Disposition     ED Disposition Condition Date/Time Comment    Discharge Stable Sat Oct 19, 2019  8:41 PM Anu Swenson discharge to home/self care              Follow-up Information     Follow up With Specialties Details Why Contact Jovan Pennington DO Family Medicine Schedule an appointment as soon as possible for a visit in 3 days  [facility] [address]  [address]  [phone]            Patient's Medications   Discharge Prescriptions ALBUTEROL (PROVENTIL HFA,VENTOLIN HFA) 90 MCG/ACT INHALER    Inhale 2 puffs every 6 (six) hours as needed for shortness of breath for up to 7 days       Start Date: 10/19/2019End Date: 10/26/2019       Order Dose: 2 puffs       Quantity: 1 Inhaler    Refills: 0     No discharge procedures on file      ED Provider  Electronically Signed by           Gretchen Javier MD  10/19/19 2281

## 2019-10-24 ENCOUNTER — VBI (OUTPATIENT)
Dept: FAMILY MEDICINE CLINIC | Facility: CLINIC | Age: 34
End: 2019-10-24

## 2019-11-05 ENCOUNTER — CLINICAL SUPPORT (OUTPATIENT)
Dept: NUTRITION | Facility: HOSPITAL | Age: 34
End: 2019-11-05
Payer: COMMERCIAL

## 2019-11-05 VITALS — WEIGHT: 248.68 LBS | BODY MASS INDEX: 39.97 KG/M2 | HEIGHT: 66 IN

## 2019-11-05 DIAGNOSIS — E66.01 MORBID OBESITY WITH BMI OF 40.0-44.9, ADULT (HCC): ICD-10-CM

## 2019-11-05 PROCEDURE — 97803 MED NUTRITION INDIV SUBSEQ: CPT

## 2019-11-05 NOTE — PROGRESS NOTES
Follow-Up Nutrition Assessment Form    Patient Name: Manuela Webb    YOB: 1985    Sex: Female      Follow Up Date: 11/5/2019  Start Time: 8:02 Stop Time: 8:32 Total Minutes: 30     Data:  Present at session: self   Parent/Patient Concerns: Patient had flare up of Sjogrens syndrome last two weeks   Medical Dx/Reason for Referral:  E66 01, Z68 41 Morbid obesity with BMI of 40 0-44 9, adult        Past Medical History:   Diagnosis Date    Arthritis     osteoarthritis    Benign paroxysmal vertigo, unspecified ear     Bilateral temporomandibular joint disorder, unspecified     Depression     Disease of thyroid gland     autoimmune thyroiditis    Dizziness and giddiness     Dyspnea     Fibromyalgia     Sicca syndrome, unspecified ([address] )     Sjogren's syndrome ([address] )     Spondylolysis, lumbosacral     Sprain of ligaments of cervical spine     Viral intestinal infection, unspecified        Current Outpatient Medications   Medication Sig Dispense Refill    buPROPion (WELLBUTRIN) 100 mg tablet Take 1 tablet (100 mg total) by mouth daily 90 tablet 3    clonazePAM (KlonoPIN) 0 25 MG disintegrating tablet Take 0 25 mg by mouth as needed for anxiety       gabapentin (NEURONTIN) 100 mg capsule 1 tab qhs x1 week, then 1 tab bid x1 week, then 1 tab tid thereafter (Patient taking differently: 100 mg 3 (three) times a day 1 tab qhs x1 week, then 1 tab bid x1 week, then 1 tab tid thereafter) 90 capsule 2    hydroxychloroquine (PLAQUENIL) 200 mg tablet Take 200 mg by mouth 2 (two) times a day with meals       ibuprofen (MOTRIN) 600 mg tablet Take 1 tablet (600 mg total) by mouth every 6 (six) hours as needed for mild pain or moderate pain for up to 20 doses 20 tablet 0    omeprazole (PriLOSEC) 40 MG capsule Take 40 mg by mouth daily       sertraline (ZOLOFT) 100 mg tablet Take 100 mg by mouth daily at bedtime       No current facility-administered medications for this visit           Additional Meds/Supplements: Vitamin D 5000 IU/week    Barriers to Learning: Other: depression   Labs:    Height: Ht Readings from Last 3 Encounters:   11/05/19 5' 6" (1 676 m)   10/01/19 5' 6" (1 676 m)   08/26/19 5' 4" (1 626 m)      Weight: Wt Readings from Last 10 Encounters:   11/05/19 113 kg (248 lb 10 9 oz)   10/19/19 111 kg (244 lb)   10/01/19 111 kg (245 lb 6 oz)   08/26/19 112 kg (246 lb 11 2 oz)   08/05/19 112 kg (246 lb)   03/10/19 111 kg (245 lb)     Estimated body mass index is 40 14 kg/m² as calculated from the following:    Height as of this encounter: 5' 6" (1 676 m)  Weight as of this encounter: 113 kg (248 lb 10 9 oz)  Wt  Change Since Last Visit: [x]Yes     []No  Amount: 3lb increase/1 2%      Energy Needs: No calculations performed for this visit   Pain Screen: Are you having pain now? Location: yes; Severity: all over, fibromyalgia 2 or 3 out of 10      Goals Achieved: Gerardo Hdz was able to use to switch to 1% milk  She has been trying to choose chicken more often, although during her acute flare up with Sjogrens the hamburger was easier to swallow  She has been more aware of increasing being active, achieved 10,000 steps per day yesterday  New Goals:   1  Gerardo Hdz will maintain a daily food log and include daily steps taken  2  Gerardo Hdz will review nutrition labels for sodium intake to meet a goal of 2300 mg/day or less of sodium intake  3  Gerardo Hdz will measure her cereal portion daily for breakfast each day  Initial PES:    Overweight/obesity  related to Excess energy intake as evidenced by  BMI more than normative standard for age and sex (obesity-grade II 35-39  9)   New PES: No Change      New Problem List:  1  Had a flare up of Sjogren's syndrome, required ED visit, using PRN albuterol inhaler  2    3        Assessment:  Gerardo Hdz is concerned about her recent flare-up of Sjogren's, she felt dehydrated and has been focusing on adequate fluid intake  Her wt   Has increased 3lb since the last visit  Her BMI is 40 14 obese class 3  She is concerned about her intakes when her depression is increased, because at times she will eat excessively, and other times has anorexia  She is positive about her improved physical activity  In the past month daily steps recorded on her smart watch have increased from around 5,000 per day to most recently 10,000 per day  She stated at work she is walking on breaks instead of sitting  She also said she started snacking more vegetables, ate celery with peanut butter instead of a cupcake and if she gets hungry for chocolate, choose a bite-size piece instead of whole candy bar  She reported choosing more to fruit for something sweet instead of cupcakes  She started keeping journal of food/fluid intake along with steps taken each day  Charltote also decided to increase awareness of portion sizes by measuring her cereal portion daily at breakfast  She also discussed noting her mood daily in journaling  She reported intake of processed foods, so sodium intake/daily recommendations were discussed along with identifying sodium on food labels and menu nutrition information  New goals were set, a follow up visit was planned for December 10, 2019        Medical Nutrition Therapy Intervention:  []Individualized Meal Plan []Understanding Lab Values   []Basic Pathophysiology of Disease []Food/Medication Interactions   [x]Food Diary [x]Exercise   [x]Lifestyle/Behavior Modification Techniques []Medication, Mechanism of Action   [x]Label Reading []Self Blood Glucose Monitoring   []Weight/BMI Goals []Other -    Other Notes:        Comprehension: []Excellent  [x]Very Good  []Good  []Fair   []Poor    Receptivity: []Excellent  []Very Good  [x]Good  []Fair   []Poor    Expected Compliance: []Excellent  []Very Good  [x]Good  []Fair   []Poor      Labs:  CMP  Lab Results   Component Value Date     05/22/2018    K 3 7 08/12/2019     08/12/2019    CO2 27 08/12/2019    ANIONGAP 13 2 05/22/2018 BUN 18 08/12/2019    CREATININE 1 02 08/12/2019    CALCIUM 9 5 08/12/2019    AST 16 08/12/2019    ALT 17 08/12/2019    ALKPHOS 76 08/12/2019    PROT 7 1 05/22/2018    BILITOT 0 4 05/22/2018    EGFR 73 08/12/2019       BMP  Lab Results   Component Value Date    CALCIUM 9 5 08/12/2019     05/22/2018    K 3 7 08/12/2019    CO2 27 08/12/2019     08/12/2019    BUN 18 08/12/2019    CREATININE 1 02 08/12/2019       Lipids  No results found for: CHOL  No results found for: HDL  No results found for: LDLCALC  No results found for: TRIG  No results found for: CHOLHDL    Hemoglobin A1C  No results found for: HGBA1C    Fasting Glucose  No results found for: GLUF    Insulin     Thyroid  No results found for: TSH, H3IQPBM, T9BADHH, THYROIDAB    Hepatic Function Panel  Lab Results   Component Value Date    ALT 17 08/12/2019    AST 16 08/12/2019    ALKPHOS 76 08/12/2019    BILITOT 0 4 05/22/2018       Celiac Disease Antibody Panel  No results found for: ENDOMYSIAL IGA, GLIADIN IGA, GLIADIN IGG, IGA, TISSUE TRANSGLUT AB, TTG IGA   Iron  No results found for: IRON, TIBC, FERRITIN    Vitamins  No results found for: VITAMIN B2   No results found for: NICOTINAMIDE, NICOTINIC ACID   No results found for: VITAMINB6  Lab Results   Component Value Date    BUGBQQGR91 305 05/22/2018     No results found for: VITB5  No results found for: R7ZBNLIH  No results found for: THYROGLB  No results found for: VITAMIN K   No results found for: 25-HYDROXY VIT D   No components found for: VITAMINE     Next follow up 12/10/19    Estela Meeks [facility] RDN  [facility] CLINICAL NUTRITION SERVICES  [facility]  [phone]

## 2019-11-05 NOTE — LETTER
11/5/2019  Kel Melara    1985     Dear Dr Alaina Montes De Oca,     Thank you for referring Kel Melara to the Outpatient Nutrition Program at [facility]  Medical Nutrition Therapy was delivered on 10/1/2019 and included individualized nutritional diagnostic therapy and counseling for the purposes of managing the following dx/disease:   1  Morbid obesity with BMI of 40 0-44 9, adult ([address] )       Therapy Goals:  Goals Achieved: Viviana Valerio was able to use to switch to 1% milk  She has been trying to choose chicken more often, although during her acute flare up with Sjogrens the hamburger was easier to swallow  She has been more aware of increasing being active, achieved 10,000 steps per day yesterday  New Goals:   1  Viviana Valerio will maintain a daily food log and include daily steps taken  2  Viviana Valerio will review nutrition labels for sodium intake to meet a goal of 2300 mg/day or less of sodium intake  3  Viviana Valerio will measure her cereal portion daily for breakfast each day  New Problem List:  New Problem List:  1  Had a flare up of Sjogren's syndrome, required ED visit, using PRN albuterol inhaler  2    3        Follow up planned for monitoring and evaluation: December 10, 2019    Please contact me with questions/concerns  Thank you for supporting Medical Nutrition Therapy  Estela Meeks [facility] RDN  [facility] CLINICAL NUTRITION SERVICES  [facility] [address] [facility]  [phone]

## 2019-11-08 NOTE — PROGRESS NOTES
Assessment and Plan: This is a 44-year-old female presenting today for follow-up for Sjogren's syndrome currently utilizing hydroxychloroquine and fibromyalgia, recently started on gabapentin  The patient states that she has noticed some improvement of her muscle and joint pain since starting gabapentin, 100 mg, 3 times a day  She does continue to note widespread joint and muscle pain with associated swelling affect her hands, knees , and ankles   She describes minimal morning stiffness and continues to have sicca symptoms  Her exam today does not reveal any evidence of inflammatory arthropathy  She does however have multiple tender joints and myofascial tender points  Patient's Sjogren syndrome appears to be stable well controlled with the use of hydroxychloroquine  Her symptoms today are most consistent with myofascial pain syndrome and since she did notice improvement with the use of gabapentin, we will plan to increase her dose to 300 mg 3 times a day  She is up-to-date on routine eye exams for drug toxicity monitoring  Her most recent labs were stable and continue to reveal a low titer SSB with minimally elevated inflammatory markers  She has been supplementing with vitamin-D as her vitamin-D level was low  She will plan to repeat labs in 4 months time before her next appointment however contact the office in the interim if she has any further questions or concerns  Plan:  Diagnoses and all orders for this visit:    Sjogren's syndrome with keratoconjunctivitis sicca (HCC)  -     CBC and differential  -     Comprehensive metabolic panel  -     C-reactive protein  -     Sedimentation rate, automated    Fibromyalgia  -     gabapentin (NEURONTIN) 300 mg capsule; Take 1 capsule (300 mg total) by mouth 3 (three) times a day    Chronic bilateral low back pain without sciatica        Follow-up plan: F/U with Dr Vi Amin in 4 months       Rheumatic Disease Summary:  1   Sjogren's- +SSB 1 4, -SSA (dx made by PCP in 2016 and then followed by Dr Lesia Moody in 2017 at [facility])   Serologies: Negative: EKATERINA, RF, CCP, Marx, RNP, C3, C4, dsDNA  -Currently on HCQ with improvement     -Restasis eyedrops to manage sicca symptoms  2  FMS- gabapentin (8/2019)  3  Co morbidities: Depression, chronic low back pain  4  Drug Monitoring- annual eye exams          HEIDY Guerrero is a 30 y o   female who presents today for follow up for Sjogren's  Started gabapentin 100mg TID and some improvement  Tolerating well  Pain primarily in the BUE and BLE, radiating from the elbow to the shoulders and into the whole legs  +Swelling in the hands and ankles and knees  All day long, not worse at the end of the day  +AM Stiffness x 10 minutes  +Difficulty with sleep due to hip pain  +Fatigue  Taking HCQ, last eye exam: up to date  Vitamin D 5000 IUs weekly since low vitamin D level in August          The following portions of the patient's history were reviewed and updated as appropriate: allergies, current medications, past family history, past medical history, past social history, past surgical history and problem list     Review of Systems:   Review of Systems   Constitutional: Positive for fatigue  Negative for appetite change, chills, fever and unexpected weight change         "low grade temps"- ""     HENT: Negative for congestion, mouth sores and sore throat  No recent infxn  "Tongue" pain with weather changes  Eyes: Negative for pain, redness and visual disturbance  +sicca sx    Respiratory: Positive for cough (dry)  Negative for chest tightness and shortness of breath  Cardiovascular: Negative for chest pain and leg swelling  Gastrointestinal: Negative for abdominal pain, blood in stool, constipation, diarrhea, nausea and vomiting  Endocrine: Negative for polydipsia and polyuria  Genitourinary: Negative for frequency and hematuria  Skin: Negative for color change and rash          No rashes, no alopecia, no nail changes  +/- purplish color changes in the hands with temp changes  +red puffy hands with "moving around a lot"    Neurological: Negative for weakness, light-headedness, numbness and headaches  Hematological: Negative for adenopathy  Psychiatric/Behavioral: Negative for behavioral problems  The patient is not nervous/anxious  Home Medications:     Current Outpatient Medications:     buPROPion (WELLBUTRIN) 100 mg tablet, Take 1 tablet (100 mg total) by mouth daily, Disp: 90 tablet, Rfl: 3    clonazePAM (KlonoPIN) 0 25 MG disintegrating tablet, Take 0 25 mg by mouth as needed for anxiety , Disp: , Rfl:     gabapentin (NEURONTIN) 100 mg capsule, 1 tab qhs x1 week, then 1 tab bid x1 week, then 1 tab tid thereafter (Patient taking differently: 100 mg 3 (three) times a day 1 tab qhs x1 week, then 1 tab bid x1 week, then 1 tab tid thereafter), Disp: 90 capsule, Rfl: 2    hydroxychloroquine (PLAQUENIL) 200 mg tablet, Take 200 mg by mouth 2 (two) times a day with meals , Disp: , Rfl:     ibuprofen (MOTRIN) 600 mg tablet, Take 1 tablet (600 mg total) by mouth every 6 (six) hours as needed for mild pain or moderate pain for up to 20 doses, Disp: 20 tablet, Rfl: 0    omeprazole (PriLOSEC) 40 MG capsule, Take 40 mg by mouth daily , Disp: , Rfl:     sertraline (ZOLOFT) 100 mg tablet, Take 100 mg by mouth daily at bedtime, Disp: , Rfl:     Objective:    Vitals:    11/11/19 1326   BP: 126/84   Pulse: 80   Weight: 113 kg (249 lb 1 9 oz)   Height: 5' 6" (1 676 m)       Physical Exam   Constitutional: She is oriented to person, place, and time  She appears well-developed and well-nourished  HENT:   Head: Normocephalic  Mouth/Throat: Oropharynx is clear and moist    Eyes: Pupils are equal, round, and reactive to light  Conjunctivae are normal    Neck: Normal range of motion  Neck supple  Cardiovascular: Normal rate, regular rhythm and normal heart sounds     Pulmonary/Chest: Effort normal and breath sounds normal    Musculoskeletal:   No synovitis or joint swelling   +Multiple tender joints and myofascial tender points of the spine  +Crepitus of the knees  Neurological: She is alert and oriented to person, place, and time  Skin: Skin is warm and dry  Psychiatric: She has a normal mood and affect  Her behavior is normal      Musculoskeletal--Peripheral Joint Exam:  Physical Exam        Imaging:   XR hips 3/10/19: IMPRESSION:  No acute osseous abnormality      XR right shoulder 3/10/19: IMPRESSION:  No acute osseous abnormality      MRI cervical 5/2018:   IMPRESSION:   Mild degenerative changes of the cervical spine are identified   There is no central canal stenosis appreciated   No abnormal enhancement is identified following the administration of intravenous gadolinium        Labs:   Component      Latest Ref Rng & Units 8/12/2019   WBC      4 80 - 10 80 Thousand/uL 8 50   Red Blood Cell Count      3 90 - 5 20 Million/uL 4 57   Hemoglobin      12 0 - 16 0 g/dL 12 4   HCT      42 0 - 47 0 % 37 6 (L)   MCV      81 - 99 fL 82   MCH      26 0 - 34 0 pg 27 2   MCHC      31 0 - 37 0 g/dL 33 1   RDW      11 5 - 14 5 % 15 7 (H)   MPV      8 6 - 11 7 fL 8 5 (L)   Platelet Count      431 - 390 Thousands/uL 283   Neutrophils %      42 - 75 % 43   Lymphocytes Relative      21 - 51 % 43   Monocytes Relative      2 - 12 % 6   Eosinophils      0 - 5 % 8 (H)   Basophils Relative      0 - 2 % 1   Absolute Neutrophils      1 40 - 6 50 Thousands/µL 3 60   Lymphocytes Absolute      0 60 - 4 47 Thousands/µL 3 60   Absolute Monocytes      0 17 - 1 22 Thousand/µL 0 50   Absolute Eosinophils      0 00 - 0 61 Thousand/µL 0 70 (H)   Basophils Absolute      0 00 - 0 10 Thousands/µL 0 10   Sodium      134 - 143 mmol/L 139   Potassium      3 5 - 5 5 mmol/L 3 7   Chloride      98 - 107 mmol/L 104   CO2      21 - 31 mmol/L 27   Anion Gap      4 - 13 mmol/L 8   BUN      7 - 25 mg/dL 18   Creatinine      0 60 - 1 20 mg/dL 1 02   Glucose, Random      65 - 99 mg/dL 88   Calcium      8 6 - 10 5 mg/dL 9 5   AST      13 - 39 U/L 16   ALT      7 - 52 U/L 17   Alkaline Phosphatase      40 - 150 U/L 76   Total Protein      6 4 - 8 9 g/dL 6 8   Albumin      3 5 - 5 7 g/dL 4 1   TOTAL BILIRUBIN      0 20 - 1 00 mg/dL 0 20   eGFR      ml/min/1 73sq m 73   HEPATITIS B SURFACE ANTIGEN      Non-reactive, NonReactive - Confirmed Non-reactive   HEPATITIS C ANTIBODY      Non-reactive Non-reactive   HEPATITIS B CORE IGM ANTIBODY      Non-reactive Non-reactive   HEPATITIS B CORE TOTAL ANTIBODY      Non-reactive Non-reactive   SSA (RO) ANTIBODY      0 0 - 0 9 AI <0 2   SSB (LA) ANTIBODY      0 0 - 0 9 AI 1 4 (H)   CHAPIN TO SMITH (SM) ANTIBODY      0 0 - 0 9 AI <0 2   CHAPIN TO RNP ANTIBODY      0 0 - 0 9 AI <0 2   ANTI-NUCLEAR ANTIBODY (EKATERINA)      Negative Negative   ANTI DNA DOUBLE STRANDED      0 - 9 IU/mL <1   C3 Complement      90 0 - 180 0 mg/dL 130 0   C4, COMPLEMENT      10 0 - 40 0 mg/dL 22 0   C-REACTIVE PROTEIN      <3 0 mg/L 5 4 (H)   CYCLIC CITRULLINATED PEPTIDE ANTIBODY      0 - 19 units 6   Sed Rate      0 - 20 mm/hour 19   Vit D, 25-Hydroxy      30 0 - 100 0 ng/mL 20 8 (L)   TSH 3RD GENERATON      0 450 - 5 330 uIU/mL 5 050   RHEUMATOID FACTOR      Negative Negative

## 2019-11-11 ENCOUNTER — OFFICE VISIT (OUTPATIENT)
Dept: RHEUMATOLOGY | Facility: CLINIC | Age: 34
End: 2019-11-11
Payer: COMMERCIAL

## 2019-11-11 VITALS
WEIGHT: 249.12 LBS | HEART RATE: 80 BPM | SYSTOLIC BLOOD PRESSURE: 126 MMHG | HEIGHT: 66 IN | BODY MASS INDEX: 40.04 KG/M2 | DIASTOLIC BLOOD PRESSURE: 84 MMHG

## 2019-11-11 DIAGNOSIS — M54.50 CHRONIC BILATERAL LOW BACK PAIN WITHOUT SCIATICA: ICD-10-CM

## 2019-11-11 DIAGNOSIS — M79.7 FIBROMYALGIA: ICD-10-CM

## 2019-11-11 DIAGNOSIS — G89.29 CHRONIC BILATERAL LOW BACK PAIN WITHOUT SCIATICA: ICD-10-CM

## 2019-11-11 DIAGNOSIS — M35.01 SJOGREN'S SYNDROME WITH KERATOCONJUNCTIVITIS SICCA (HCC): Primary | ICD-10-CM

## 2019-11-11 PROCEDURE — 99214 OFFICE O/P EST MOD 30 MIN: CPT | Performed by: PHYSICIAN ASSISTANT

## 2019-11-11 RX ORDER — GABAPENTIN 300 MG/1
300 CAPSULE ORAL 3 TIMES DAILY
Qty: 90 CAPSULE | Refills: 2 | Status: SHIPPED | OUTPATIENT
Start: 2019-11-11 | End: 2020-07-25

## 2019-12-17 ENCOUNTER — CLINICAL SUPPORT (OUTPATIENT)
Dept: NUTRITION | Facility: HOSPITAL | Age: 34
End: 2019-12-17
Payer: COMMERCIAL

## 2019-12-17 VITALS — WEIGHT: 243.61 LBS | BODY MASS INDEX: 39.32 KG/M2

## 2019-12-17 DIAGNOSIS — E66.01 MORBID OBESITY WITH BMI OF 40.0-44.9, ADULT (HCC): ICD-10-CM

## 2019-12-17 PROCEDURE — 97803 MED NUTRITION INDIV SUBSEQ: CPT

## 2019-12-17 NOTE — PROGRESS NOTES
Follow-Up Nutrition Assessment Form    Patient Name: Rhianna Khan    YOB: 1985    Sex: Female      Follow Up Date: 12/17/2019  Start Time: 9:10 Stop Time: 9:41 Total Minutes: 30     Data:  Present at session: self   Parent/Patient Concerns:    Medical Dx/Reason for Referral: E66 01, Z68 41 Morbid obesity with BMI of 40 0-44 9, adult   Past Medical History:   Diagnosis Date    Arthritis     osteoarthritis    Benign paroxysmal vertigo, unspecified ear     Bilateral temporomandibular joint disorder, unspecified     Depression     Disease of thyroid gland     autoimmune thyroiditis    Dizziness and giddiness     Dyspnea     Fibromyalgia     Sicca syndrome, unspecified ([address] )     Sjogren's syndrome ([address] )     Spondylolysis, lumbosacral     Sprain of ligaments of cervical spine     Viral intestinal infection, unspecified        Current Outpatient Medications   Medication Sig Dispense Refill    buPROPion (WELLBUTRIN) 100 mg tablet Take 1 tablet (100 mg total) by mouth daily 90 tablet 3    clonazePAM (KlonoPIN) 0 25 MG disintegrating tablet Take 0 25 mg by mouth as needed for anxiety       gabapentin (NEURONTIN) 300 mg capsule Take 1 capsule (300 mg total) by mouth 3 (three) times a day 90 capsule 2    hydroxychloroquine (PLAQUENIL) 200 mg tablet Take 200 mg by mouth 2 (two) times a day with meals       ibuprofen (MOTRIN) 600 mg tablet Take 1 tablet (600 mg total) by mouth every 6 (six) hours as needed for mild pain or moderate pain for up to 20 doses 20 tablet 0    omeprazole (PriLOSEC) 40 MG capsule Take 40 mg by mouth daily       sertraline (ZOLOFT) 100 mg tablet Take 100 mg by mouth daily at bedtime       No current facility-administered medications for this visit           Additional Meds/Supplements:    Barriers to Learning: Other: depression   Labs:    Height: Ht Readings from Last 3 Encounters:   11/11/19 5' 6" (1 676 m)   11/05/19 5' 6" (1 676 m)   10/01/19 5' 6" (1 676 m) Weight: Wt Readings from Last 10 Encounters:   12/17/19 110 kg (243 lb 9 7 oz)   11/11/19 113 kg (249 lb 1 9 oz)   11/05/19 113 kg (248 lb 10 9 oz)   10/19/19 111 kg (244 lb)   10/01/19 111 kg (245 lb 6 oz)   08/26/19 112 kg (246 lb 11 2 oz)   08/05/19 112 kg (246 lb)   03/10/19 111 kg (245 lb)     Estimated body mass index is 39 32 kg/m² as calculated from the following:    Height as of 11/11/19: 5' 6" (1 676 m)  Weight as of this encounter: 110 kg (243 lb 9 7 oz)  Wt  Change Since Last Visit: [x]Yes     []No  Amount: 5lb/2% loss      Energy Needs: No calculations performed for this visit   Pain Screen: Are you having pain now? Location: neck, headache ; Severity: 4/10      Goals Achieved: Sonja Stahl is choosing skim milk more consistently  She has been exercising about 5-6 minutes about 1 5 miles about 2-3 times per week  New Goals:   1  Sonja Stahl will choose a wheat roll 2 times per week when she chooses subway sandwiches at work for her lunch  2   Sonja Stahl will increase her days to every other day 1 5-2 0 miles/session during her bicycling routine  3         Initial PES:   Overweight/obesity  related to Excess energy intake as evidenced by  BMI more than normative standard for age and sex (obesity-grade II 35-39  9)    New PES: No Change      New Problem List:  1     2    3        Assessment:  Sonja Stahl presents today with a 5lb/2% wt  Loss since her last visit  Her BMI is 39 32 obese class 3  She is positive about changes she has been sustaining  She stated she is avoiding fast food restaurants more purposefully with the exception of getting coffee on the weekends she works  She has been including more vegetables at her meals and noted increased family support at dinner with healthier meals  She usually is choosing green beans, broccoli cauliflower and eats at least a cup  She sometimes snacks on vegetables  She stated she is choosing less sugary cereals rice krispies or chex, and using fat free milk   When she works at her [employer] job she sometimes eats subway for lunch or doxo that have apples, meats and pretzels in them  Her daily steps range between 0500-7577/day  She has been trying to be more conscious and walk around the store more instead of sitting  She has some economical concerns trying to eat healthy, and noted she has been able to find lower cost frozen vegetables to meet her needs/preferences  Discussed handling portions on holidays, had half portions  She stated she is drinking more water at meals with high fiber vegetables to prevent swallowing difficulties  Natalia Parks is drinking more water, but still has a soda or iced tea daily  Reviewed amount of sugar in those beverages compared to [address] for added sugar in foods  Patient to contemplate decreasing portion size of sugar drinks since she does not prefer artificial sweetener tastes  Reviewed building on exercise frequency and duration  Also discussed incorporating whole grains into meals  Patient will call to schedule another appointment next year, stated she is changing insurance and needs to find out more about coverage for nutrition counseling       Medical Nutrition Therapy Intervention:  [x]Individualized Meal Plan []Understanding Lab Values   []Basic Pathophysiology of Disease []Food/Medication Interactions   []Food Diary []Exercise   [x]Lifestyle/Behavior Modification Techniques []Medication, Mechanism of Action   []Label Reading []Self Blood Glucose Monitoring   [x]Weight/BMI Goals []Other -    Other Notes:        Comprehension: [x]Excellent  []Very Good  []Good  []Fair   []Poor    Receptivity: []Excellent  [x]Very Good  []Good  []Fair   []Poor    Expected Compliance: []Excellent  []Very Good  [x]Good  []Fair   []Poor      Labs:  CMP  Lab Results   Component Value Date     05/22/2018    K 3 7 08/12/2019     08/12/2019    CO2 27 08/12/2019    ANIONGAP 13 2 05/22/2018    BUN 18 08/12/2019 CREATININE 1 02 08/12/2019    CALCIUM 9 5 08/12/2019    AST 16 08/12/2019    ALT 17 08/12/2019    ALKPHOS 76 08/12/2019    PROT 7 1 05/22/2018    BILITOT 0 4 05/22/2018    EGFR 73 08/12/2019       BMP  Lab Results   Component Value Date    CALCIUM 9 5 08/12/2019     05/22/2018    K 3 7 08/12/2019    CO2 27 08/12/2019     08/12/2019    BUN 18 08/12/2019    CREATININE 1 02 08/12/2019       Lipids  No results found for: CHOL  No results found for: HDL  No results found for: LDLCALC  No results found for: TRIG  No results found for: CHOLHDL    Hemoglobin A1C  No results found for: HGBA1C    Fasting Glucose  No results found for: GLUF    Insulin     Thyroid  No results found for: TSH, O1HTSHL, N6YWINH, THYROIDAB    Hepatic Function Panel  Lab Results   Component Value Date    ALT 17 08/12/2019    AST 16 08/12/2019    ALKPHOS 76 08/12/2019    BILITOT 0 4 05/22/2018       Celiac Disease Antibody Panel  No results found for: ENDOMYSIAL IGA, GLIADIN IGA, GLIADIN IGG, IGA, TISSUE TRANSGLUT AB, TTG IGA   Iron  No results found for: IRON, TIBC, FERRITIN    Vitamins  No results found for: VITAMIN B2   No results found for: NICOTINAMIDE, NICOTINIC ACID   No results found for: VITAMINB6  Lab Results   Component Value Date    WOGMSNLF27 305 05/22/2018     No results found for: VITB5  No results found for: C9WEMLWI  No results found for: THYROGLB  No results found for: VITAMIN K   No results found for: 25-HYDROXY VIT D   No components found for: VITAMINE     No follow-ups on file  Estela Meeks [facility] RDN  [facility] CLINICAL NUTRITION SERVICES  [address] [facility]  [phone]

## 2019-12-17 NOTE — LETTER
12/17/2019  Feroz Taylor    1985     Dear Dr Jennifer Trujillo,     Thank you for referring Feroz Taylor to the Outpatient Nutrition Program at [facility] [place]  Medical Nutrition Therapy was delivered on 12/17/2019 and included individualized nutritional diagnostic therapy and counseling for the purposes of managing the following dx/disease: Morbid obesity with BMI of 40 0-44 9 adult    Therapy Goals:  Goals Achieved: Louisa Cali is choosing skim milk more consistently  She has been exercising about 5-6 minutes about 1 5 miles about 2-3 times per week  New Goals:   1  Louisa Cali will choose a wheat roll 2 times per week when she chooses subway sandwiches at work for her lunch  2   Louisa Cali will increase her days to every other day 1 5-2 0 miles/session during her bicycling routine  3       New Problem List:  New Problem List:  1     2    3        Follow up planned for monitoring and evaluation: Patient to call and schedule next appointment in January or early February  Please contact me with questions/concerns  Thank you for supporting Medical Nutrition Therapy  Estela Meeks [facility] RDN  [facility] CLINICAL NUTRITION SERVICES  [address] [place] [address]  [phone]

## 2019-12-27 ENCOUNTER — APPOINTMENT (OUTPATIENT)
Dept: LAB | Facility: HOSPITAL | Age: 34
End: 2019-12-27
Payer: COMMERCIAL

## 2019-12-27 LAB
ALBUMIN SERPL BCP-MCNC: 4.1 G/DL (ref 3.5–5.7)
ALP SERPL-CCNC: 61 U/L (ref 40–150)
ALT SERPL W P-5'-P-CCNC: 13 U/L (ref 7–52)
ANION GAP SERPL CALCULATED.3IONS-SCNC: 6 MMOL/L (ref 4–13)
AST SERPL W P-5'-P-CCNC: 12 U/L (ref 13–39)
BASOPHILS # BLD AUTO: 0 THOUSANDS/ΜL (ref 0–0.1)
BASOPHILS NFR BLD AUTO: 1 % (ref 0–2)
BILIRUB SERPL-MCNC: 0.3 MG/DL (ref 0.2–1)
BUN SERPL-MCNC: 12 MG/DL (ref 7–25)
CALCIUM SERPL-MCNC: 9.5 MG/DL (ref 8.6–10.5)
CHLORIDE SERPL-SCNC: 104 MMOL/L (ref 98–107)
CO2 SERPL-SCNC: 28 MMOL/L (ref 21–31)
CREAT SERPL-MCNC: 0.91 MG/DL (ref 0.6–1.2)
CRP SERPL QL: <5 MG/L
EOSINOPHIL # BLD AUTO: 0.4 THOUSAND/ΜL (ref 0–0.61)
EOSINOPHIL NFR BLD AUTO: 7 % (ref 0–5)
ERYTHROCYTE [DISTWIDTH] IN BLOOD BY AUTOMATED COUNT: 15.6 % (ref 11.5–14.5)
ERYTHROCYTE [SEDIMENTATION RATE] IN BLOOD: 11 MM/HOUR (ref 0–20)
GFR SERPL CREATININE-BSD FRML MDRD: 84 ML/MIN/1.73SQ M
GLUCOSE P FAST SERPL-MCNC: 78 MG/DL (ref 65–99)
HCT VFR BLD AUTO: 40.4 % (ref 42–47)
HGB BLD-MCNC: 13.3 G/DL (ref 12–16)
LYMPHOCYTES # BLD AUTO: 2.2 THOUSANDS/ΜL (ref 0.6–4.47)
LYMPHOCYTES NFR BLD AUTO: 41 % (ref 21–51)
MCH RBC QN AUTO: 27.2 PG (ref 26–34)
MCHC RBC AUTO-ENTMCNC: 32.9 G/DL (ref 31–37)
MCV RBC AUTO: 83 FL (ref 81–99)
MONOCYTES # BLD AUTO: 0.4 THOUSAND/ΜL (ref 0.17–1.22)
MONOCYTES NFR BLD AUTO: 7 % (ref 2–12)
NEUTROPHILS # BLD AUTO: 2.4 THOUSANDS/ΜL (ref 1.4–6.5)
NEUTS SEG NFR BLD AUTO: 44 % (ref 42–75)
PLATELET # BLD AUTO: 275 THOUSANDS/UL (ref 149–390)
PMV BLD AUTO: 8.7 FL (ref 8.6–11.7)
POTASSIUM SERPL-SCNC: 4.1 MMOL/L (ref 3.5–5.5)
PROT SERPL-MCNC: 6.8 G/DL (ref 6.4–8.9)
RBC # BLD AUTO: 4.9 MILLION/UL (ref 3.9–5.2)
SODIUM SERPL-SCNC: 138 MMOL/L (ref 134–143)
WBC # BLD AUTO: 5.4 THOUSAND/UL (ref 4.8–10.8)

## 2019-12-27 PROCEDURE — 86140 C-REACTIVE PROTEIN: CPT | Performed by: PHYSICIAN ASSISTANT

## 2019-12-27 PROCEDURE — 80053 COMPREHEN METABOLIC PANEL: CPT | Performed by: PHYSICIAN ASSISTANT

## 2019-12-27 PROCEDURE — 36415 COLL VENOUS BLD VENIPUNCTURE: CPT | Performed by: PHYSICIAN ASSISTANT

## 2019-12-27 PROCEDURE — 85025 COMPLETE CBC W/AUTO DIFF WBC: CPT | Performed by: PHYSICIAN ASSISTANT

## 2019-12-27 PROCEDURE — 85652 RBC SED RATE AUTOMATED: CPT | Performed by: PHYSICIAN ASSISTANT

## 2020-02-18 RX ORDER — LEVONORGESTREL AND ETHINYL ESTRADIOL 0.1-0.02MG
1 KIT ORAL DAILY
Refills: 3 | COMMUNITY
Start: 2019-11-19

## 2020-02-19 ENCOUNTER — OFFICE VISIT (OUTPATIENT)
Dept: FAMILY MEDICINE CLINIC | Facility: CLINIC | Age: 35
End: 2020-02-19
Payer: COMMERCIAL

## 2020-02-19 VITALS
OXYGEN SATURATION: 97 % | SYSTOLIC BLOOD PRESSURE: 128 MMHG | HEART RATE: 78 BPM | BODY MASS INDEX: 39.31 KG/M2 | WEIGHT: 244.6 LBS | HEIGHT: 66 IN | DIASTOLIC BLOOD PRESSURE: 82 MMHG

## 2020-02-19 DIAGNOSIS — M94.0 COSTOCHONDRITIS: Primary | ICD-10-CM

## 2020-02-19 PROCEDURE — 3008F BODY MASS INDEX DOCD: CPT | Performed by: FAMILY MEDICINE

## 2020-02-19 PROCEDURE — 1036F TOBACCO NON-USER: CPT | Performed by: FAMILY MEDICINE

## 2020-02-19 PROCEDURE — 99214 OFFICE O/P EST MOD 30 MIN: CPT | Performed by: FAMILY MEDICINE

## 2020-02-19 RX ORDER — MELOXICAM 7.5 MG/1
7.5 TABLET ORAL 2 TIMES DAILY PRN
Qty: 50 TABLET | Refills: 2 | Status: SHIPPED | OUTPATIENT
Start: 2020-02-19 | End: 2022-01-26 | Stop reason: ALTCHOICE

## 2020-02-19 NOTE — PROGRESS NOTES
Assessment/Plan:    Costochondritis  Patient has costochondritis  No evidence of pleurisy by exam   I have ordered a chest x-ray  I recommended treatment with NSAIDs whenever she has her flare ups  Certainly, the lifting she does not work can exacerbate this or even cause this  I told her to discontinue ibuprofen  I started her on meloxicam 7 5 mg b i d  With food  She will take this for a week and then stop it  She will use it p r n  Flare ups  Diagnoses and all orders for this visit:    Costochondritis  -     XR chest pa & lateral; Future  -     meloxicam (MOBIC) 7 5 mg tablet; Take 1 tablet (7 5 mg total) by mouth 2 (two) times a day as needed (costochondritis)    Other orders  -     SRONYX 0 1-20 MG-MCG per tablet; Take 1 tablet by mouth daily        BMI Counseling: Body mass index is 39 78 kg/m²  The BMI is above normal  Nutrition recommendations include decreasing portion sizes, encouraging healthy choices of fruits and vegetables, decreasing fast food intake, consuming healthier snacks, limiting drinks that contain sugar and moderation in carbohydrate intake  Exercise recommendations include exercising 3-5 times per week  No pharmacotherapy was ordered  Subjective:      Patient ID: Aden Hurtado is a 30 y o  female  This patient is a 29-year-old white female presents to the office today complaining of sharp pain in her ribs and collarbone  She tells me that her symptoms began 4-5 days ago  She denies any incidents of trauma  She tells me that the rib pain is bilaterally  It hurts when she takes deep breaths  It does not hurt with movements  She reports that it is in the collarbone area on the left as well  She tells me this also hurts when she takes deep breaths  She tells me these pains tend to come and go  It will occur every 1-3 months  When it occurs, it lasts 1-2 weeks and then it resolves  Taking ibuprofen does help  She denies cough, wheezing, sputum production    She denies leg pain and leg swelling  The following portions of the patient's history were reviewed and updated as appropriate: allergies, current medications, past family history, past medical history, past social history, past surgical history and problem list     Review of Systems   Cardiovascular: Positive for chest pain  Negative for palpitations and leg swelling  Objective:      /82 (BP Location: Left arm, Patient Position: Sitting, Cuff Size: Adult)   Pulse 78   Ht 5' 5 75" (1 67 m)   Wt 111 kg (244 lb 9 6 oz)   SpO2 97%   BMI 39 78 kg/m²          Physical Exam   Constitutional: She appears well-developed and well-nourished  No distress  HENT:   Head: Normocephalic and atraumatic  Right Ear: External ear normal    Left Ear: External ear normal    Mouth/Throat: Oropharynx is clear and moist  No oropharyngeal exudate  Eyes: Pupils are equal, round, and reactive to light  Conjunctivae are normal  No scleral icterus  Neck: Neck supple  No tracheal deviation present  No thyromegaly present  Cardiovascular: Normal rate, regular rhythm and normal heart sounds  Exam reveals no gallop and no friction rub  No murmur heard  Pulmonary/Chest: Effort normal and breath sounds normal  No stridor  No respiratory distress  She has no wheezes  She has no rales  She exhibits tenderness (Tenderness is elicited to palpation over the costochondral joints and even over the lower anterior ribcage  )  Negative for pleuritic friction rub  Abdominal: Soft  Bowel sounds are normal  She exhibits no distension and no mass  There is no tenderness  There is no guarding  There is no organomegaly noted   Lymphadenopathy:     She has no cervical adenopathy  Vitals reviewed  extremities:  Without cyanosis, clubbing, or edema  There was no calf tenderness  Homans sign was negative bilaterally

## 2020-02-19 NOTE — PATIENT INSTRUCTIONS
Costochondritis   AMBULATORY CARE:   Costochondritis  is a condition that causes pain in the cartilage that connects your ribs to your sternum (breastbone)  Cartilage is the tough, bendable tissue that protects your bones  Common symptoms include the following:   · Sharp or dull, aching pain that may come and go or that gets worse over time    · Pain when you touch your chest    · Pain that spreads to your back, abdomen, or down your arm    · Pain that gets worse when you move, breathe deeply, or push or lift an object    · Trouble sleeping or doing your usual activities because of the pain  Seek immediate care if:   · Fever    · The painful areas of your chest look swollen and red, and feel warm to the touch    · Pain prevents you from sleeping  Contact your healthcare provider if:   · You have a fever  · The painful areas of your chest look swollen, red, and feel warm to the touch  · You cannot sleep because of the pain  · You have questions or concerns about your condition or care  Treatment for costochondritis  may not be needed  Costochondritis pain may go away without treatment, usually within a year  Treatment may include any of the following:  · Acetaminophen  decreases pain  Acetaminophen is available without a doctor's order  Ask how much to take and how often to take it  Follow directions  Acetaminophen can cause liver damage if not taken correctly  · NSAIDs , such as ibuprofen, help decrease swelling, pain, and fever  This medicine is available with or without a doctor's order  NSAIDs can cause stomach bleeding or kidney problems in certain people  If you take blood thinner medicine, always ask if NSAIDs are safe for you  Always read the medicine label and follow directions  Do not give these medicines to children under 10months of age without direction from your child's healthcare provider  Manage your symptoms:   · Rest as needed  Avoid painful movements and activities   Do not carry objects, such as a purse or backpack, if this causes pain  Avoid activities such as weightlifting until your pain decreases or goes away  Ask your healthcare provider which activities are best for you to do while you recover  · Apply heat to help decrease pain  Apply heat on the area for 20 to 30 minutes every 2 hours for as many days as directed  · Apply ice to help decrease swelling and pain  Ice may also help prevent tissue damage  Use an ice pack, or put crushed ice in a plastic bag  Cover it with a towel and place it on the painful area for 15 to 20 minutes every hour or as directed  · Do gentle stretching exercises   a doorway and put your hands on the door frame at the level of your ears or shoulders  Take 1 step forward and gently stretch your chest  Try this with your hands higher up on the doorway  Follow up with your healthcare provider as directed:  Write down your questions so you remember to ask them during your visits  © 2017 [employer] [employer] Information is for End User's use only and may not be sold, redistributed or otherwise used for commercial purposes  All illustrations and images included in CareNotes® are the copyrighted property of [employer]  or Anson Johnson  The above information is an  only  It is not intended as medical advice for individual conditions or treatments  Talk to your doctor, nurse or pharmacist before following any medical regimen to see if it is safe and effective for you

## 2020-02-20 NOTE — ASSESSMENT & PLAN NOTE
Patient has costochondritis  No evidence of pleurisy by exam   I have ordered a chest x-ray  I recommended treatment with NSAIDs whenever she has her flare ups  Certainly, the lifting she does not work can exacerbate this or even cause this  I told her to discontinue ibuprofen  I started her on meloxicam 7 5 mg b i d  With food  She will take this for a week and then stop it  She will use it p r n  Flare ups

## 2020-02-24 ENCOUNTER — HOSPITAL ENCOUNTER (OUTPATIENT)
Dept: RADIOLOGY | Facility: HOSPITAL | Age: 35
Discharge: HOME | End: 2020-02-24
Payer: COMMERCIAL

## 2020-02-24 DIAGNOSIS — M94.0 COSTOCHONDRITIS: ICD-10-CM

## 2020-02-24 PROCEDURE — 71046 X-RAY EXAM CHEST 2 VIEWS: CPT

## 2020-03-02 ENCOUNTER — OFFICE VISIT (OUTPATIENT)
Dept: FAMILY MEDICINE CLINIC | Facility: CLINIC | Age: 35
End: 2020-03-02
Payer: COMMERCIAL

## 2020-03-02 VITALS
SYSTOLIC BLOOD PRESSURE: 124 MMHG | BODY MASS INDEX: 38.42 KG/M2 | WEIGHT: 244.8 LBS | OXYGEN SATURATION: 97 % | DIASTOLIC BLOOD PRESSURE: 82 MMHG | HEIGHT: 67 IN | HEART RATE: 71 BPM

## 2020-03-02 DIAGNOSIS — Z00.00 ANNUAL PHYSICAL EXAM: Primary | ICD-10-CM

## 2020-03-02 DIAGNOSIS — K21.9 GASTROESOPHAGEAL REFLUX DISEASE WITHOUT ESOPHAGITIS: ICD-10-CM

## 2020-03-02 DIAGNOSIS — R42 DIZZINESS: ICD-10-CM

## 2020-03-02 DIAGNOSIS — M94.0 COSTOCHONDRITIS: ICD-10-CM

## 2020-03-02 DIAGNOSIS — F32.0 MAJOR DEPRESSIVE DISORDER, SINGLE EPISODE, MILD (HCC): ICD-10-CM

## 2020-03-02 PROCEDURE — 99395 PREV VISIT EST AGE 18-39: CPT | Performed by: FAMILY MEDICINE

## 2020-03-02 RX ORDER — CLONAZEPAM 0.25 MG/1
0.25 TABLET, ORALLY DISINTEGRATING ORAL 2 TIMES DAILY PRN
Qty: 60 TABLET | Refills: 5 | Status: SHIPPED | OUTPATIENT
Start: 2020-03-02 | End: 2021-06-25

## 2020-03-02 RX ORDER — BUPROPION HYDROCHLORIDE 100 MG/1
100 TABLET ORAL DAILY
Qty: 90 TABLET | Refills: 3 | Status: SHIPPED | OUTPATIENT
Start: 2020-03-02 | End: 2021-06-14 | Stop reason: DRUGHIGH

## 2020-03-02 RX ORDER — SERTRALINE HYDROCHLORIDE 100 MG/1
100 TABLET, FILM COATED ORAL
Qty: 90 TABLET | Refills: 3 | Status: SHIPPED | OUTPATIENT
Start: 2020-03-02 | End: 2021-05-03 | Stop reason: SDUPTHER

## 2020-03-02 RX ORDER — OMEPRAZOLE 40 MG/1
40 CAPSULE, DELAYED RELEASE ORAL DAILY
Qty: 90 CAPSULE | Refills: 3 | Status: SHIPPED | OUTPATIENT
Start: 2020-03-02 | End: 2021-07-23 | Stop reason: SDUPTHER

## 2020-03-02 NOTE — PROGRESS NOTES
Assessment/Plan:    Annual physical exam  Patient presented to the office today for her annual physical   We discussed her antidepressant treatment  She is on 2 different antidepressants, started by Psychiatry  She is no longer seeing Psychiatry  I did state if we needed to change the medication I would recommend she go back to Psychiatry  Patient wants to continue her current regiment and does feel that it is helping her  I reviewed her most recent labs which were done in December  I have not recommended any new labs at this time  Gastroesophageal reflux disease without esophagitis  Patient will continue omeprazole 40 mg daily  She may take an over-the-counter antacid as needed  She reports it really has not been much of a problem  Costochondritis  Her costochondritis resolved after 1 week of therapy with NSAIDs  Dizziness  It should be noted that I examined the patient's abdomen with her supine on the exam room table, as per custom  When she sat up, she became very dizzy  She asked to be referred to a new neurologist for evaluation of her dizziness  It sounds orthostatic to me  I recommended we center for a tilt test   She wants to see Neurology  She told me she would agree to a tilt test if they can find anything wrong with her  She tells me she already saw ENT and they ruled out any type of his tibial err etiology  I went ahead and put in a referral to see Dr Cinthya Romero       Diagnoses and all orders for this visit:    Annual physical exam    Major depressive disorder, single episode, mild (HCC)  -     clonazePAM (KlonoPIN) 0 25 MG disintegrating tablet; Take 1 tablet (0 25 mg total) by mouth 2 (two) times a day as needed for anxiety  -     buPROPion (WELLBUTRIN) 100 mg tablet; Take 1 tablet (100 mg total) by mouth daily  -     sertraline (ZOLOFT) 100 mg tablet;  Take 1 tablet (100 mg total) by mouth daily at bedtime    Gastroesophageal reflux disease without esophagitis  -     omeprazole (PriLOSEC) 40 MG capsule; Take 1 capsule (40 mg total) by mouth daily    Dizziness  -     Ambulatory referral to Neurology; Future    Costochondritis          Subjective:      Patient ID: Wade Macias is a 30 y o  female  This patient is a 30-year-old white female who presents to the office today for her annual physical   Patient was here with her boyfriend today  Patient tells me she is doing well  She is currently taking 2 antidepressants  She asked for refills  She tells me that her symptoms are not 100% but she is much improved  She tells me they are still times where she does not feel like doing anything and she has mild anxiety and depression but overall is doing well  She does complain of depression  She had seen Neurology in the past, doctor Bianka Dsouza  She states his phone is no longer in service  She would like to see Neurology for follow-up of her dizziness which she has had now for 4-5 years  She tells me she also saw ENT in the past but they did not find anything wrong with her  The following portions of the patient's history were reviewed and updated as appropriate: allergies, current medications, past family history, past medical history, past social history, past surgical history and problem list     Review of Systems   Constitutional: Positive for fatigue  HENT:        Reports chronic dry mouth   Cardiovascular: Negative for chest pain, palpitations and leg swelling  Gastrointestinal: Negative for abdominal distention, abdominal pain, blood in stool, constipation, diarrhea, nausea and vomiting  Reports occasional heartburn despite taking omeprazole 40 mg daily   Musculoskeletal: Positive for arthralgias  Neurological: Positive for dizziness  Psychiatric/Behavioral: Positive for dysphoric mood  Negative for sleep disturbance  The patient is nervous/anxious            Objective:      /82 (BP Location: Left arm, Patient Position: Sitting, Cuff Size: Large)   Pulse 71  5' 6 5" (1 689 m)   Wt 111 kg (244 lb 12 8 oz)   SpO2 97%   BMI 38 92 kg/m²          Physical Exam   Constitutional:   The patient is an obese 65-year-old white female who appears her stated age  She was in no apparent distress   HENT:   Head: Normocephalic and atraumatic  Right Ear: External ear normal    Left Ear: External ear normal    Mouth/Throat: Oropharynx is clear and moist  No oropharyngeal exudate  Tympanic membranes are normal in appearance   Eyes: Pupils are equal, round, and reactive to light  Conjunctivae are normal  No scleral icterus  Neck: Neck supple  No tracheal deviation present  No thyromegaly present  Cardiovascular: Normal rate, regular rhythm and normal heart sounds  Exam reveals no gallop and no friction rub  No murmur heard  Pulmonary/Chest: Effort normal and breath sounds normal  No stridor  No respiratory distress  She has no wheezes  She has no rales  Abdominal: Soft  Bowel sounds are normal  She exhibits no distension and no mass  There is no tenderness  There is no guarding  No organomegaly was noted   Lymphadenopathy:     She has no cervical adenopathy  Psychiatric: She has a normal mood and affect  Her behavior is normal  Judgment and thought content normal    Vitals reviewed      extremities:  Without cyanosis, clubbing, or edema

## 2020-03-02 NOTE — PATIENT INSTRUCTIONS
Dizziness   WHAT YOU NEED TO KNOW:   What is dizziness? Dizziness is a feeling of being off balance or unsteady  Common causes of dizziness are an inner ear fluid imbalance or a lack of oxygen in your blood  Dizziness may be acute (lasts 3 days or less) or chronic (lasts longer than 3 days)  You may have dizzy spells that last from seconds to a few hours  What increases my risk for dizziness? Dizziness may get worse during certain activities or when you move a certain way  The following may also increase your risk for dizziness:  · Older age    · An infection, ear surgery, or an inner ear condition, such as Ménière disease    · Stroke, a brain tumor, or a recent head trauma     · An injury that causes a large amount of blood loss    · Heart or blood pressure problems     · Exposure to chemicals, or long-term alcohol use     · Medicines used to treat high blood pressure, seizure disorders, or anxiety and depression     · A nerve disorder, such as multiple sclerosis  What signs and symptoms may happen with dizziness? · A feeling that your surroundings are moving even though you are standing still    · Ringing in your ears or hearing loss     · Feeling faint or lightheaded     · Weakness or unsteadiness     · Double vision or eye movements you cannot control    · Nausea or vomiting     · Confusion  How is the cause of dizziness diagnosed? Your healthcare provider may ask when the dizziness started  Tell the provider if you have dizzy spells, and how long they last  Tell him or her what happens before you become dizzy  The provider will ask if you have other health conditions and if you take any medicines  He or she will check your blood pressure and pulse to see if your dizziness may be related to your heart  Your balance, strength, reflexes, and the way you walk may also be checked   You may need any of the following tests to help find the cause of your dizziness:  · An EKG  records the electrical activity of your heart  An EKG can be used to check for an abnormal heart beat or heart damage  · Blood tests  will check your blood sugar level, infection, and your blood cell levels  · CT or MRI  pictures check for a stroke, head injury, or brain tumor  They also check for brain bleeding or swelling  You may be given contrast liquid to help your brain show up better in the pictures  Tell the healthcare provider if you have ever had an allergic reaction to contrast liquid  Do not enter the MRI room with anything metal  Metal can cause serious injury  Tell the healthcare provider if you have any metal in or on your body  How is dizziness treated? Treatment will depend on the cause of your dizziness  Your healthcare provider may give you oxygen or medicines to decrease your dizziness and nausea  He may also refer you to a specialist  Muriel Carpenter may need to be admitted to the hospital for treatment  How can I manage my symptoms? · Do not drive  or operate heavy machinery when you are dizzy  · Get up slowly  from sitting or lying down  · Drink plenty of liquids  Liquids help prevent dehydration  Ask how much liquid to drink each day and which liquids are best for you  When should I seek immediate care? · You have a headache and a stiff neck  · You have shaking chills and a fever  · You vomit over and over with no relief  · Your vomit or bowel movements are red or black  · You have pain in your chest, back, or abdomen  · You have numbness, especially in your face, arms, or legs  · You have trouble moving your arms or legs  · You are confused  When should I contact my healthcare provider? · You have a fever  · Your symptoms do not get better with treatment  · You have questions or concerns about your condition or care  CARE AGREEMENT:   You have the right to help plan your care  Learn about your health condition and how it may be treated   Discuss treatment options with your caregivers to decide what care you want to receive  You always have the right to refuse treatment  The above information is an  only  It is not intended as medical advice for individual conditions or treatments  Talk to your doctor, nurse or pharmacist before following any medical regimen to see if it is safe and effective for you  © 2017 [employer] [employer] Information is for End User's use only and may not be sold, redistributed or otherwise used for commercial purposes  All illustrations and images included in CareNotes® are the copyrighted property of [employer]  or Anson Johnson

## 2020-03-03 NOTE — ASSESSMENT & PLAN NOTE
Patient presented to the office today for her annual physical   We discussed her antidepressant treatment  She is on 2 different antidepressants, started by Psychiatry  She is no longer seeing Psychiatry  I did state if we needed to change the medication I would recommend she go back to Psychiatry  Patient wants to continue her current regiment and does feel that it is helping her  I reviewed her most recent labs which were done in December  I have not recommended any new labs at this time

## 2020-03-03 NOTE — ASSESSMENT & PLAN NOTE
Patient will continue omeprazole 40 mg daily  She may take an over-the-counter antacid as needed  She reports it really has not been much of a problem

## 2020-03-10 ENCOUNTER — TELEPHONE (OUTPATIENT)
Dept: NEUROLOGY | Facility: CLINIC | Age: 35
End: 2020-03-10

## 2020-03-10 NOTE — TELEPHONE ENCOUNTER
Message left re sooner appointment for 3/11/20 at 6 with Dr Cinthya Romero at [facility], [facility]  office

## 2020-03-16 ENCOUNTER — OFFICE VISIT (OUTPATIENT)
Dept: RHEUMATOLOGY | Facility: CLINIC | Age: 35
End: 2020-03-16
Payer: COMMERCIAL

## 2020-03-16 VITALS — HEART RATE: 80 BPM | HEIGHT: 67 IN | BODY MASS INDEX: 38.41 KG/M2 | WEIGHT: 244.71 LBS

## 2020-03-16 DIAGNOSIS — Z79.899 LONG-TERM USE OF PLAQUENIL: ICD-10-CM

## 2020-03-16 DIAGNOSIS — M35.01 SJOGREN'S SYNDROME WITH KERATOCONJUNCTIVITIS SICCA (HCC): Primary | ICD-10-CM

## 2020-03-16 DIAGNOSIS — M79.7 FIBROMYALGIA: ICD-10-CM

## 2020-03-16 DIAGNOSIS — E55.9 VITAMIN D DEFICIENCY: ICD-10-CM

## 2020-03-16 PROCEDURE — 99214 OFFICE O/P EST MOD 30 MIN: CPT | Performed by: INTERNAL MEDICINE

## 2020-03-16 PROCEDURE — 1036F TOBACCO NON-USER: CPT | Performed by: INTERNAL MEDICINE

## 2020-03-16 PROCEDURE — 3008F BODY MASS INDEX DOCD: CPT | Performed by: INTERNAL MEDICINE

## 2020-03-16 RX ORDER — HYDROXYCHLOROQUINE SULFATE 200 MG/1
400 TABLET, FILM COATED ORAL
Qty: 180 TABLET | Refills: 1 | Status: SHIPPED | OUTPATIENT
Start: 2020-03-16 | End: 2021-05-06

## 2020-03-16 NOTE — PROGRESS NOTES
Assessment and Plan:   Patient is a 56-year-old female who presents for rheumatology follow-up for Sjogren syndrome  She has had a low positive SSB antibody along with fatigue, arthralgia and sicca symptoms  She presented to me last visit to establish care, already on Plaquenil which had improved her fatigue and arthralgias  She also continues to use Restasis for her dry eyes  She has no new complaints or concerns today and appear stable on the Plaquenil  We also reviewed that her workup from last visit did not reveal concerns for other autoimmune markers or diseases  We will plan a follow-up in about 9 months  Plan:  Diagnoses and all orders for this visit:    Sjogren's syndrome with keratoconjunctivitis sicca (HCC)  -     hydroxychloroquine (PLAQUENIL) 200 mg tablet; Take 2 tablets (400 mg total) by mouth daily with breakfast    Vitamin D deficiency    Long-term use of Plaquenil    Fibromyalgia        Follow-up plan: 9 months        Rheumatic Disease Summary  1  Sjogren's syndrome  -Est with me 8/5/19 for Sjogrens with prior borderline +SSB, previously dx by Dr William Crooks 2016 and he started plaquenil which improved joint pain and fatigue; also saw [facility] rheum Dr Monie Reddy in 2017  -Labs 9/2018: +SSB 1 4, neg SSA Serologies: Negative  EKATERINA, RF, CCP, Marx, RNP, C3, C4, dsDNA  -Presented on plaquenil and Restasis, stable  -Labs 8/2019: +SSB 1 4, negative EKATERINA, SSA, dsDNA, smith, RNP, RF, CCP, hep panel, normal C3/4, CRP 5 4, ESR 19, Vit D 20  -Visit 3/16/20: stable on plaquenil, no changes   2  Fibromyalgia  -Gabapentin started 8/2019 with improvement   -XRs lumbar/SI joints 8/2019: OA and DDD, no inflammatory changes   3  Comorbidities: GERD, anxiety, depression, endometriosis     HPI  Sameera Trinidad is a 30 y o   female who presents for rheumatology follow-up for Sjogren syndrome  She presented last visit to establish care  She presented on Plaquenil and Restasis eyedrops      She recently had rib pain and was diagnosed with costochondritis by her PCP  She has using meloxicam 7 5 mg as needed for this and it has helped  She states she does get this intermittently  No other new changes in her health  Denies new rashes  Prior to today she was feeling increased fatigue and arthralgia that was generalized over a few days but also had an increased work schedule  She now feels better today and does not have any joint pain  Denies any morning stiffness or swelling in the joints  She continues to use Restasis for her eyes which has helped a lot for the dryness  She is still on Plaquenil and reports that she last had an eye exam in the fall 2019 which was normal     The following portions of the patient's history were reviewed and updated as appropriate: allergies, current medications, past family history, past medical history, past social history, past surgical history and problem list     Review of Systems:   Review of Systems   Constitutional: Negative for fatigue and unexpected weight change  HENT: Negative for mouth sores  Eyes:        +dry eyes   Respiratory: Negative for cough and shortness of breath  Gastrointestinal: Negative for constipation and diarrhea  Musculoskeletal: Negative for arthralgias, back pain, joint swelling and myalgias  Skin: Negative for color change and rash  Neurological: Negative for weakness  Psychiatric/Behavioral: Negative for sleep disturbance         Home Medications:    Current Outpatient Medications:     buPROPion (WELLBUTRIN) 100 mg tablet, Take 1 tablet (100 mg total) by mouth daily, Disp: 90 tablet, Rfl: 3    clonazePAM (KlonoPIN) 0 25 MG disintegrating tablet, Take 1 tablet (0 25 mg total) by mouth 2 (two) times a day as needed for anxiety, Disp: 60 tablet, Rfl: 5    gabapentin (NEURONTIN) 300 mg capsule, Take 1 capsule (300 mg total) by mouth 3 (three) times a day, Disp: 90 capsule, Rfl: 2    hydroxychloroquine (PLAQUENIL) 200 mg tablet, Take 2 tablets (400 mg total) by mouth daily with breakfast, Disp: 180 tablet, Rfl: 1    meloxicam (MOBIC) 7 5 mg tablet, Take 1 tablet (7 5 mg total) by mouth 2 (two) times a day as needed (costochondritis), Disp: 50 tablet, Rfl: 2    omeprazole (PriLOSEC) 40 MG capsule, Take 1 capsule (40 mg total) by mouth daily, Disp: 90 capsule, Rfl: 3    sertraline (ZOLOFT) 100 mg tablet, Take 1 tablet (100 mg total) by mouth daily at bedtime, Disp: 90 tablet, Rfl: 3    SRONYX 0 1-20 MG-MCG per tablet, Take 1 tablet by mouth daily, Disp: , Rfl: 3    Objective:    Vitals:    03/16/20 1122   Pulse: 80   Weight: 111 kg (244 lb 11 4 oz)   Height: 5' 6 5" (1 689 m)       Physical Exam   Constitutional: She appears well-developed and well-nourished  She is cooperative  No distress  HENT:   Head: Normocephalic and atraumatic  Eyes: Conjunctivae, EOM and lids are normal  No scleral icterus  Neck: Neck supple  Pulmonary/Chest: Effort normal  No accessory muscle usage  No tachypnea  No respiratory distress  Musculoskeletal:   No reproducible soft tissue or joint tenderness  No joint swelling or synovitis anywhere  Neurological: She is alert  Skin: Skin is dry and intact  No rash noted  Psychiatric: She has a normal mood and affect   Her behavior is normal        Labs:   Component      Latest Ref Rng & Units 12/27/2019   WBC      4 80 - 10 80 Thousand/uL 5 40   Red Blood Cell Count      3 90 - 5 20 Million/uL 4 90   Hemoglobin      12 0 - 16 0 g/dL 13 3   HCT      42 0 - 47 0 % 40 4 (L)   MCV      81 - 99 fL 83   MCH      26 0 - 34 0 pg 27 2   MCHC      31 0 - 37 0 g/dL 32 9   RDW      11 5 - 14 5 % 15 6 (H)   MPV      8 6 - 11 7 fL 8 7   Platelet Count      102 - 390 Thousands/uL 275   Neutrophils %      42 - 75 % 44   Lymphocytes Relative      21 - 51 % 41   Monocytes Relative      2 - 12 % 7   Eosinophils      0 - 5 % 7 (H)   Basophils Relative      0 - 2 % 1   Absolute Neutrophils      1 40 - 6 50 Thousands/µL 2 40   Lymphocytes Absolute      0 60 - 4 47 Thousands/µL 2 20   Absolute Monocytes      0 17 - 1 22 Thousand/µL 0 40   Absolute Eosinophils      0 00 - 0 61 Thousand/µL 0 40   Basophils Absolute      0 00 - 0 10 Thousands/µL 0 00   Sodium      134 - 143 mmol/L 138   Potassium      3 5 - 5 5 mmol/L 4 1   Chloride      98 - 107 mmol/L 104   CO2      21 - 31 mmol/L 28   Anion Gap      4 - 13 mmol/L 6   BUN      7 - 25 mg/dL 12   Creatinine      0 60 - 1 20 mg/dL 0 91   GLUCOSE FASTING      65 - 99 mg/dL 78   Calcium      8 6 - 10 5 mg/dL 9 5   AST      13 - 39 U/L 12 (L)   ALT      7 - 52 U/L 13   Alkaline Phosphatase      40 - 150 U/L 61   Total Protein      6 4 - 8 9 g/dL 6 8   Albumin      3 5 - 5 7 g/dL 4 1   TOTAL BILIRUBIN      0 20 - 1 00 mg/dL 0 30   eGFR      ml/min/1 73sq m 84   C-REACTIVE PROTEIN      <7 5 mg/L <5 0   Sed Rate      0 - 20 mm/hour 11     Component      Latest Ref Rng & Units 8/12/2019   WBC      4 80 - 10 80 Thousand/uL 8 50   Red Blood Cell Count      3 90 - 5 20 Million/uL 4 57   Hemoglobin      12 0 - 16 0 g/dL 12 4   HCT      42 0 - 47 0 % 37 6 (L)   MCV      81 - 99 fL 82   MCH      26 0 - 34 0 pg 27 2   MCHC      31 0 - 37 0 g/dL 33 1   RDW      11 5 - 14 5 % 15 7 (H)   MPV      8 6 - 11 7 fL 8 5 (L)   Platelet Count      563 - 390 Thousands/uL 283   Neutrophils %      42 - 75 % 43   Lymphocytes Relative      21 - 51 % 43   Monocytes Relative      2 - 12 % 6   Eosinophils      0 - 5 % 8 (H)   Basophils Relative      0 - 2 % 1   Absolute Neutrophils      1 40 - 6 50 Thousands/µL 3 60   Lymphocytes Absolute      0 60 - 4 47 Thousands/µL 3 60   Absolute Monocytes      0 17 - 1 22 Thousand/µL 0 50   Absolute Eosinophils      0 00 - 0 61 Thousand/µL 0 70 (H)   Basophils Absolute      0 00 - 0 10 Thousands/µL 0 10   Sodium      134 - 143 mmol/L 139   Potassium      3 5 - 5 5 mmol/L 3 7   Chloride      98 - 107 mmol/L 104   CO2      21 - 31 mmol/L 27   Anion Gap      4 - 13 mmol/L 8   BUN      7 - 25 mg/dL 18 Creatinine      0 60 - 1 20 mg/dL 1 02   Glucose, Random      65 - 99 mg/dL 88   Calcium      8 6 - 10 5 mg/dL 9 5   AST      13 - 39 U/L 16   ALT      7 - 52 U/L 17   Alkaline Phosphatase      40 - 150 U/L 76   Total Protein      6 4 - 8 9 g/dL 6 8   Albumin      3 5 - 5 7 g/dL 4 1   TOTAL BILIRUBIN      0 20 - 1 00 mg/dL 0 20   eGFR      ml/min/1 73sq m 73   HEPATITIS B SURFACE ANTIGEN      Non-reactive, NonReactive - Confirmed Non-reactive   HEPATITIS C ANTIBODY      Non-reactive Non-reactive   HEPATITIS B CORE IGM ANTIBODY      Non-reactive Non-reactive   HEPATITIS B CORE TOTAL ANTIBODY      Non-reactive Non-reactive   SSA (RO) ANTIBODY      0 0 - 0 9 AI <0 2   SSB (LA) ANTIBODY      0 0 - 0 9 AI 1 4 (H)   CHAPIN TO SMITH (SM) ANTIBODY      0 0 - 0 9 AI <0 2   CHAPIN TO RNP ANTIBODY      0 0 - 0 9 AI <0 2   ANTI-NUCLEAR ANTIBODY (EKATERINA)      Negative Negative   ANTI DNA DOUBLE STRANDED      0 - 9 IU/mL <1   C3 Complement      90 0 - 180 0 mg/dL 130 0   C4, COMPLEMENT      10 0 - 40 0 mg/dL 22 0   C-REACTIVE PROTEIN      <3 0 mg/L 5 4 (H)   CYCLIC CITRULLINATED PEPTIDE ANTIBODY      0 - 19 units 6   Sed Rate      0 - 20 mm/hour 19   Vit D, 25-Hydroxy      30 0 - 100 0 ng/mL 20 8 (L)   TSH 3RD GENERATON      0 450 - 5 330 uIU/mL 5 050   RHEUMATOID FACTOR      Negative Negative

## 2020-06-01 ENCOUNTER — TELEMEDICINE (OUTPATIENT)
Dept: FAMILY MEDICINE CLINIC | Facility: CLINIC | Age: 35
End: 2020-06-01
Payer: COMMERCIAL

## 2020-06-01 VITALS — WEIGHT: 249 LBS | HEIGHT: 67 IN | TEMPERATURE: 99.1 F | BODY MASS INDEX: 39.08 KG/M2

## 2020-06-01 DIAGNOSIS — Z20.828 EXPOSURE TO SARS-ASSOCIATED CORONAVIRUS: Primary | ICD-10-CM

## 2020-06-01 PROCEDURE — 99213 OFFICE O/P EST LOW 20 MIN: CPT | Performed by: FAMILY MEDICINE

## 2020-06-01 PROCEDURE — U0003 INFECTIOUS AGENT DETECTION BY NUCLEIC ACID (DNA OR RNA); SEVERE ACUTE RESPIRATORY SYNDROME CORONAVIRUS 2 (SARS-COV-2) (CORONAVIRUS DISEASE [COVID-19]), AMPLIFIED PROBE TECHNIQUE, MAKING USE OF HIGH THROUGHPUT TECHNOLOGIES AS DESCRIBED BY CMS-2020-01-R: HCPCS | Performed by: FAMILY MEDICINE

## 2020-06-03 LAB — SARS-COV-2 RNA SPEC QL NAA+PROBE: NOT DETECTED

## 2020-06-28 ENCOUNTER — HOSPITAL ENCOUNTER (EMERGENCY)
Facility: HOSPITAL | Age: 35
Discharge: HOME | End: 2020-06-29
Attending: EMERGENCY MEDICINE | Admitting: EMERGENCY MEDICINE
Payer: COMMERCIAL

## 2020-06-28 DIAGNOSIS — R07.9 CHEST PAIN: Primary | ICD-10-CM

## 2020-06-28 PROCEDURE — 99285 EMERGENCY DEPT VISIT HI MDM: CPT

## 2020-06-28 PROCEDURE — 99284 EMERGENCY DEPT VISIT MOD MDM: CPT | Performed by: EMERGENCY MEDICINE

## 2020-06-28 PROCEDURE — 85025 COMPLETE CBC W/AUTO DIFF WBC: CPT | Performed by: EMERGENCY MEDICINE

## 2020-06-28 PROCEDURE — 36415 COLL VENOUS BLD VENIPUNCTURE: CPT | Performed by: EMERGENCY MEDICINE

## 2020-06-28 PROCEDURE — 80053 COMPREHEN METABOLIC PANEL: CPT | Performed by: EMERGENCY MEDICINE

## 2020-06-28 PROCEDURE — 93005 ELECTROCARDIOGRAM TRACING: CPT

## 2020-06-28 PROCEDURE — 85379 FIBRIN DEGRADATION QUANT: CPT | Performed by: EMERGENCY MEDICINE

## 2020-06-28 PROCEDURE — 84484 ASSAY OF TROPONIN QUANT: CPT | Performed by: EMERGENCY MEDICINE

## 2020-06-29 ENCOUNTER — APPOINTMENT (EMERGENCY)
Dept: RADIOLOGY | Facility: HOSPITAL | Age: 35
End: 2020-06-29
Payer: COMMERCIAL

## 2020-06-29 VITALS
DIASTOLIC BLOOD PRESSURE: 60 MMHG | SYSTOLIC BLOOD PRESSURE: 135 MMHG | WEIGHT: 245 LBS | OXYGEN SATURATION: 99 % | HEIGHT: 66 IN | RESPIRATION RATE: 19 BRPM | TEMPERATURE: 97.9 F | HEART RATE: 57 BPM | BODY MASS INDEX: 39.37 KG/M2

## 2020-06-29 LAB
ALBUMIN SERPL BCP-MCNC: 4.1 G/DL (ref 3.5–5.7)
ALP SERPL-CCNC: 55 U/L (ref 40–150)
ALT SERPL W P-5'-P-CCNC: 12 U/L (ref 7–52)
ANION GAP SERPL CALCULATED.3IONS-SCNC: 7 MMOL/L (ref 4–13)
AST SERPL W P-5'-P-CCNC: 15 U/L (ref 13–39)
ATRIAL RATE: 67 BPM
BASOPHILS # BLD AUTO: 0.1 THOUSANDS/ΜL (ref 0–0.1)
BASOPHILS NFR BLD AUTO: 1 % (ref 0–2)
BILIRUB SERPL-MCNC: 0.3 MG/DL (ref 0.2–1)
BUN SERPL-MCNC: 12 MG/DL (ref 7–25)
CALCIUM SERPL-MCNC: 9.3 MG/DL (ref 8.6–10.5)
CHLORIDE SERPL-SCNC: 104 MMOL/L (ref 98–107)
CO2 SERPL-SCNC: 26 MMOL/L (ref 21–31)
CREAT SERPL-MCNC: 0.91 MG/DL (ref 0.6–1.2)
D DIMER PPP FEU-MCNC: <0.27 UG/ML FEU
EOSINOPHIL # BLD AUTO: 0.6 THOUSAND/ΜL (ref 0–0.61)
EOSINOPHIL NFR BLD AUTO: 7 % (ref 0–5)
ERYTHROCYTE [DISTWIDTH] IN BLOOD BY AUTOMATED COUNT: 15.3 % (ref 11.5–14.5)
GFR SERPL CREATININE-BSD FRML MDRD: 84 ML/MIN/1.73SQ M
GLUCOSE SERPL-MCNC: 83 MG/DL (ref 65–99)
HCG SERPL QL: NEGATIVE
HCT VFR BLD AUTO: 38 % (ref 42–47)
HGB BLD-MCNC: 12.4 G/DL (ref 12–16)
LYMPHOCYTES # BLD AUTO: 2.9 THOUSANDS/ΜL (ref 0.6–4.47)
LYMPHOCYTES NFR BLD AUTO: 37 % (ref 21–51)
MCH RBC QN AUTO: 27.4 PG (ref 26–34)
MCHC RBC AUTO-ENTMCNC: 32.7 G/DL (ref 31–37)
MCV RBC AUTO: 84 FL (ref 81–99)
MONOCYTES # BLD AUTO: 0.6 THOUSAND/ΜL (ref 0.17–1.22)
MONOCYTES NFR BLD AUTO: 8 % (ref 2–12)
NEUTROPHILS # BLD AUTO: 3.8 THOUSANDS/ΜL (ref 1.4–6.5)
NEUTS SEG NFR BLD AUTO: 48 % (ref 42–75)
P AXIS: 8 DEGREES
PLATELET # BLD AUTO: 255 THOUSANDS/UL (ref 149–390)
PMV BLD AUTO: 8.4 FL (ref 8.6–11.7)
POTASSIUM SERPL-SCNC: 3.8 MMOL/L (ref 3.5–5.5)
PR INTERVAL: 154 MS
PROT SERPL-MCNC: 6.5 G/DL (ref 6.4–8.9)
QRS AXIS: 10 DEGREES
QRSD INTERVAL: 84 MS
QT INTERVAL: 406 MS
QTC INTERVAL: 429 MS
RBC # BLD AUTO: 4.53 MILLION/UL (ref 3.9–5.2)
SODIUM SERPL-SCNC: 137 MMOL/L (ref 134–143)
T WAVE AXIS: 33 DEGREES
TROPONIN I SERPL-MCNC: <0.03 NG/ML
TROPONIN I SERPL-MCNC: <0.03 NG/ML
VENTRICULAR RATE: 67 BPM
WBC # BLD AUTO: 8 THOUSAND/UL (ref 4.8–10.8)

## 2020-06-29 PROCEDURE — 84484 ASSAY OF TROPONIN QUANT: CPT | Performed by: EMERGENCY MEDICINE

## 2020-06-29 PROCEDURE — 93010 ELECTROCARDIOGRAM REPORT: CPT | Performed by: INTERNAL MEDICINE

## 2020-06-29 PROCEDURE — 84703 CHORIONIC GONADOTROPIN ASSAY: CPT | Performed by: EMERGENCY MEDICINE

## 2020-06-29 PROCEDURE — 36415 COLL VENOUS BLD VENIPUNCTURE: CPT | Performed by: EMERGENCY MEDICINE

## 2020-06-29 PROCEDURE — 71046 X-RAY EXAM CHEST 2 VIEWS: CPT

## 2020-06-29 NOTE — ED PROVIDER NOTES
History  Chief Complaint   Patient presents with    Chest Pain     Pt states she was climbing stairs around 2250 when she began to have CP  Pt states she's had CP r/t anxiety previously but that this pain is not the same  30YEAR-OLD FEMALE  Non SMOKER  PMH:   Sjogrens syndrome, Fibromyalgia, Endometriosis  FAMILY HISTORY:    Dad side has CAD, but no h/o MI    Pt here for CP that started around 10:49 when she was walking up the stair  The CP was in the center of the chest, it radiated bilaterally outward  PAIN WaS RATED AS Sharp and MODERATE  Pain lasted 10-15 minutes    PATIENT HAD NO SHORTNESS OF BREATH that was assoicated  NO COMPLAINTS OF DIAPHORESIS    SHE DENIES ANY RADIATION OF PAIN TO THE JAW, arm, NECK OR THE BACK  INTERVENTIONS: NONE      PATIENT DENIES ANY COUGH, NO FEVERS OR CHILLS  NO URI SYMPTOMS    VTE  RISK FACTORS:  NONE  NO HISTORY OF PE OR DVT  NO LONG CAR RIDES OR PLANE RIDES  NO IMMOBILIZATION  NO RECENT SURGERY OR TRAUMA  NO HEMOPTYSIS  OTHER ASSOCIATED SYMPTOMS:  NO ABDOMINAL PAIN  NO NAUSEA OR VOMITING  NO STOOL CHANGES    NO URINARY COMPLAINTS: NO DYSURIA, NO HEMATURIA, NO FREQUENCY      No Other complaints       History provided by:  Patient  Chest Pain   Pain location:  Substernal area  Pain radiates to:  Does not radiate  Pain radiates to the back: no    Pain severity:  Moderate  Onset quality:  Sudden  Progression:  Resolved  Chronicity:  New  Worsened by:  Nothing tried  Ineffective treatments:  None tried  Associated symptoms: no abdominal pain, no altered mental status, no anxiety, no back pain, no claudication, no cough, no diaphoresis, no dizziness, no dysphagia, no fatigue, no fever, no headache, no heartburn, no lower extremity edema, no nausea, no near-syncope, no numbness, no orthopnea, no palpitations, no shortness of breath, no syncope, not vomiting and no weakness    Risk factors: birth control    Risk factors: no aortic disease, no coronary artery disease, no diabetes mellitus, no Jimy-Danlos syndrome, no high cholesterol, no hypertension, no immobilization, not pregnant, no prior DVT/PE, no smoking and no surgery        Prior to Admission Medications   Prescriptions Last Dose Informant Patient Reported? Taking?    SRONYX 0 1-20 MG-MCG per tablet 6/28/2020 at Unknown time Self Yes Yes   Sig: Take 1 tablet by mouth daily   buPROPion (WELLBUTRIN) 100 mg tablet 6/28/2020 at Unknown time Self No Yes   Sig: Take 1 tablet (100 mg total) by mouth daily   clonazePAM (KlonoPIN) 0 25 MG disintegrating tablet Past Week at Unknown time Self No Yes   Sig: Take 1 tablet (0 25 mg total) by mouth 2 (two) times a day as needed for anxiety   gabapentin (NEURONTIN) 300 mg capsule 6/28/2020 at Unknown time Self No Yes   Sig: Take 1 capsule (300 mg total) by mouth 3 (three) times a day   hydroxychloroquine (PLAQUENIL) 200 mg tablet 6/28/2020 at Unknown time Self No Yes   Sig: Take 2 tablets (400 mg total) by mouth daily with breakfast   meloxicam (MOBIC) 7 5 mg tablet More than a month at Unknown time Self No No   Sig: Take 1 tablet (7 5 mg total) by mouth 2 (two) times a day as needed (costochondritis)   omeprazole (PriLOSEC) 40 MG capsule 6/27/2020 at Unknown time Self No Yes   Sig: Take 1 capsule (40 mg total) by mouth daily   sertraline (ZOLOFT) 100 mg tablet 6/28/2020 at Unknown time Self No Yes   Sig: Take 1 tablet (100 mg total) by mouth daily at bedtime      Facility-Administered Medications: None       Past Medical History:   Diagnosis Date    Arthritis     osteoarthritis    Benign paroxysmal vertigo, unspecified ear     Bilateral temporomandibular joint disorder, unspecified     Depression     Disease of thyroid gland     autoimmune thyroiditis    Dizziness and giddiness     Dyspnea     Fibromyalgia     Sicca syndrome, unspecified ([address] )     Sjogren's syndrome (HCC)     Spondylolysis, lumbosacral     Sprain of ligaments of cervical spine     Viral intestinal infection, unspecified        Past Surgical History:   Procedure Laterality Date    EXPLORATORY LAPAROTOMY         Family History   Problem Relation Age of Onset    Arthritis Mother     Atrial fibrillation Father     Emphysema Father     Lopez's esophagus Father     JULIO disease Father     Hypertension Father     Diabetes Father     Endometriosis Sister     Fibromyalgia Brother     Neuropathy Brother     Anxiety disorder Brother     Depression Brother     JULIO disease Brother      I have reviewed and agree with the history as documented  E-Cigarette/Vaping    E-Cigarette Use Never User      E-Cigarette/Vaping Substances    Nicotine No     THC No     CBD No     Flavoring No     Other No     Unknown No      Social History     Tobacco Use    Smoking status: Never Smoker    Smokeless tobacco: Never Used   Substance Use Topics    Alcohol use: Not Currently     Frequency: Monthly or less     Drinks per session: 1 or 2    Drug use: Never       Review of Systems   Constitutional: Negative for diaphoresis, fatigue and fever  HENT: Negative for rhinorrhea, sinus pressure, sinus pain, sneezing, sore throat, trouble swallowing and voice change  Respiratory: Negative for cough, shortness of breath, wheezing and stridor  Cardiovascular: Positive for chest pain  Negative for palpitations, orthopnea, claudication, leg swelling, syncope and near-syncope  Gastrointestinal: Negative for abdominal pain, heartburn, nausea and vomiting  Musculoskeletal: Negative for back pain  Neurological: Negative for dizziness, weakness, numbness and headaches  Physical Exam  Physical Exam   Constitutional: She is oriented to person, place, and time  She appears well-developed and well-nourished  Non-toxic appearance  She does not appear ill  No distress  HENT:   Head: Normocephalic and atraumatic  Nose: Nose normal    Mouth/Throat: Oropharynx is clear and moist  No oropharyngeal exudate  Eyes: Pupils are equal, round, and reactive to light  Conjunctivae and EOM are normal  Right eye exhibits no discharge  Left eye exhibits no discharge  No scleral icterus  Neck: Normal range of motion  Neck supple  No JVD present  No tracheal deviation present  Cardiovascular: Normal rate, regular rhythm, normal heart sounds and intact distal pulses  Exam reveals no gallop and no friction rub  No murmur heard  Pulmonary/Chest: Effort normal and breath sounds normal  No stridor  No respiratory distress  She has no wheezes  She has no rhonchi  She has no rales  She exhibits no tenderness  Abdominal: Soft  Bowel sounds are normal  She exhibits no distension and no mass  There is no tenderness  There is no rebound and no guarding  No hernia  Musculoskeletal: Normal range of motion  She exhibits no edema, tenderness or deformity  Right lower leg: Normal         Left lower leg: Normal    Lymphadenopathy:     She has no cervical adenopathy  Neurological: She is alert and oriented to person, place, and time  No cranial nerve deficit or sensory deficit  She exhibits normal muscle tone  Coordination normal    Skin: Skin is warm  Capillary refill takes less than 2 seconds  No rash noted  She is not diaphoretic  No erythema  No pallor  Psychiatric: She has a normal mood and affect   Her behavior is normal  Judgment and thought content normal        Vital Signs  ED Triage Vitals [06/28/20 2346]   Temperature Pulse Respirations Blood Pressure SpO2   97 9 °F (36 6 °C) 76 18 121/69 97 %      Temp Source Heart Rate Source Patient Position - Orthostatic VS BP Location FiO2 (%)   Temporal Monitor -- Left arm --      Pain Score       3           Vitals:    06/28/20 2346 06/29/20 0200 06/29/20 0230   BP: 121/69 115/70 118/62   Pulse: 76 59 63         Visual Acuity      ED Medications  Medications - No data to display    Diagnostic Studies  Results Reviewed     Procedure Component Value Units Date/Time    Troponin I [[id number]]  (Normal) Collected:  06/29/20 0249    Lab Status:  Final result Specimen:  Blood from Arm, Right Updated:  06/29/20 0330     Troponin I <0 03 ng/mL     D-dimer, quantitative [[id number]]  (Normal) Collected:  06/28/20 2359    Lab Status:  Final result Specimen:  Blood from Arm, Right Updated:  06/29/20 0100     D-Dimer, Quant <0 27 ug/ml FEU     hCG, qualitative pregnancy [[id number]]  (Normal) Resulted:  06/29/20 0036    Lab Status:  Final result Specimen:  Blood Updated:  06/29/20 0036     Preg, Serum Negative    Comprehensive metabolic panel [[id number]] Collected:  06/28/20 2359    Lab Status:  Final result Specimen:  Blood from Arm, Right Updated:  06/29/20 0030     Sodium 137 mmol/L      Potassium 3 8 mmol/L      Chloride 104 mmol/L      CO2 26 mmol/L      ANION GAP 7 mmol/L      BUN 12 mg/dL      Creatinine 0 91 mg/dL      Glucose 83 mg/dL      Calcium 9 3 mg/dL      AST 15 U/L      ALT 12 U/L      Alkaline Phosphatase 55 U/L      Total Protein 6 5 g/dL      Albumin 4 1 g/dL      Total Bilirubin 0 30 mg/dL      eGFR 84 ml/min/1 73sq m     Narrative:       [place] guidelines for Chronic Kidney Disease (CKD):     Stage 1 with normal or high GFR (GFR > 90 mL/min/1 73 square meters)    Stage 2 Mild CKD (GFR = 60-89 mL/min/1 73 square meters)    Stage 3A Moderate CKD (GFR = 45-59 mL/min/1 73 square meters)    Stage 3B Moderate CKD (GFR = 30-44 mL/min/1 73 square meters)    Stage 4 Severe CKD (GFR = 15-29 mL/min/1 73 square meters)    Stage 5 End Stage CKD (GFR <15 mL/min/1 73 square meters)  Note: GFR calculation is accurate only with a steady state creatinine    Troponin I [[id number]]  (Normal) Collected:  06/28/20 2359    Lab Status:  Final result Specimen:  Blood from Arm, Right Updated:  06/29/20 0027     Troponin I <0 03 ng/mL     CBC and differential [[id number]]  (Abnormal) Collected:  06/28/20 2359    Lab Status:  Final result Specimen:  Blood from Arm, Right Updated:  06/29/20 0009     WBC 8 00 Thousand/uL      RBC 4 53 Million/uL      Hemoglobin 12 4 g/dL      Hematocrit 38 0 %      MCV 84 fL      MCH 27 4 pg      MCHC 32 7 g/dL      RDW 15 3 %      MPV 8 4 fL      Platelets 770 Thousands/uL      Neutrophils Relative 48 %      Lymphocytes Relative 37 %      Monocytes Relative 8 %      Eosinophils Relative 7 %      Basophils Relative 1 %      Neutrophils Absolute 3 80 Thousands/µL      Lymphocytes Absolute 2 90 Thousands/µL      Monocytes Absolute 0 60 Thousand/µL      Eosinophils Absolute 0 60 Thousand/µL      Basophils Absolute 0 10 Thousands/µL                  XR chest 2 views   ED Interpretation by Nithya Loya MD (06/29 0118)   nad                 Procedures  Procedures         ED Course  ED Course as of Jun 29 0340   Mon Jun 29, 2020   0117 PREGNANCY, SERUM: Negative   0117 D-Dimer, Quant: <0 27   0117 Troponin I: <0 03   0117 WBC: 8 00   0117 Hemoglobin: 12 4   0117 HCT(!): 38 0   0117 Platelet Count: 084   0117 Neutrophils %: 48   0117 Sodium: 137   0117 Potassium: 3 8   0117 Chloride: 104   0117 CO2: 26   0117 Anion Gap: 7   0117 BUN: 12   0117 Creatinine: 0 91   0117 AST: 15   0117 ALT: 12   0117 TOTAL BILIRUBIN: 0 30   0130 Patient stands results of her workup  All within normal limits  I will repeat her troponin and if normal will send her home  She is very happy with plan      8553 Pt feels well  She is ready to manage from home    Troponin I: <0 03       US AUDIT      Most Recent Value   Initial Alcohol Screen: US AUDIT-C    1  How often do you have a drink containing alcohol? 2 Filed at: 06/28/2020 2349   2  How many drinks containing alcohol do you have on a typical day you are drinking? 0 Filed at: 06/28/2020 2349   3b  FEMALE Any Age, or MALE 65+: How often do you have 4 or more drinks on one occassion?   0 Filed at: 06/28/2020 2349   Audit-C Score  2 Filed at: 06/28/2020 2349            HEART Risk Score      Most Recent Value   Heart Score Risk Calculator   History  0 Filed at: 06/29/2020 0024   ECG  0 Filed at: 06/29/2020 0024   Age  0 Filed at: 06/29/2020 0024   Risk Factors  1 Filed at: 06/29/2020 0024   Troponin  0 Filed at: 06/29/2020 0024   HEART Score  1 Filed at: 06/29/2020 0024            PRICE/DAST-10      Most Recent Value   How many times in the past year have you    Used an illegal drug or used a prescription medication for non-medical reasons? Never Filed at: 06/28/2020 2349          PERC Rule for PE      Most Recent Value   PERC Rule for PE   Age >=50  0 Filed at: 06/29/2020 0023   HR >=100  0 Filed at: 06/29/2020 0023   O2 Sat on room air < 95%  0 Filed at: 06/29/2020 0023   History of PE or DVT  0 Filed at: 06/29/2020 0023   Recent trauma or surgery  0 Filed at: 06/29/2020 0023   Hemoptysis  0 Filed at: 06/29/2020 0023   Exogenous estrogen  1 Filed at: 06/29/2020 0023   Unilateral leg swelling  0 Filed at: 06/29/2020 0023   PERC Rule for PE Results  1 Filed at: 06/29/2020 0023                            MDM      Disposition  Final diagnoses:   Chest pain     Time reflects when diagnosis was documented in both MDM as applicable and the Disposition within this note     Time User Action Codes Description Comment    6/29/2020  1:29 AM Cady Farr Add [R07 9] Chest pain       ED Disposition     ED Disposition Condition Date/Time Comment    Discharge Stable Mon Jun 29, 2020  1:29 AM Manoj Salmeron discharge to home/self care  Follow-up Information     Follow up With Specialties Details Why DO [facility] Call today  [address] [place] [address] [address]  [address]  [facility] [id number] [phone]            Patient's Medications   Discharge Prescriptions    No medications on file     No discharge procedures on file      PDMP Review       Value Time User    PDMP Reviewed  Yes 3/2/2020  1:34 PM Félix Stahl DO          ED Provider  Electronically Signed by           Aniket Swartz MD  06/29/20 0810

## 2020-07-25 DIAGNOSIS — M79.7 FIBROMYALGIA: ICD-10-CM

## 2020-07-25 RX ORDER — GABAPENTIN 300 MG/1
CAPSULE ORAL
Qty: 90 CAPSULE | Refills: 5 | Status: SHIPPED | OUTPATIENT
Start: 2020-07-25 | End: 2021-08-20

## 2020-08-03 ENCOUNTER — TELEMEDICINE (OUTPATIENT)
Dept: FAMILY MEDICINE CLINIC | Facility: CLINIC | Age: 35
End: 2020-08-03
Payer: COMMERCIAL

## 2020-08-03 VITALS
SYSTOLIC BLOOD PRESSURE: 125 MMHG | TEMPERATURE: 98.7 F | DIASTOLIC BLOOD PRESSURE: 83 MMHG | BODY MASS INDEX: 39.22 KG/M2 | WEIGHT: 243 LBS

## 2020-08-03 DIAGNOSIS — Z20.828 EXPOSURE TO SARS-ASSOCIATED CORONAVIRUS: Primary | ICD-10-CM

## 2020-08-03 PROCEDURE — 99214 OFFICE O/P EST MOD 30 MIN: CPT | Performed by: FAMILY MEDICINE

## 2020-08-03 PROCEDURE — 1036F TOBACCO NON-USER: CPT | Performed by: FAMILY MEDICINE

## 2020-08-03 PROCEDURE — U0003 INFECTIOUS AGENT DETECTION BY NUCLEIC ACID (DNA OR RNA); SEVERE ACUTE RESPIRATORY SYNDROME CORONAVIRUS 2 (SARS-COV-2) (CORONAVIRUS DISEASE [COVID-19]), AMPLIFIED PROBE TECHNIQUE, MAKING USE OF HIGH THROUGHPUT TECHNOLOGIES AS DESCRIBED BY CMS-2020-01-R: HCPCS | Performed by: FAMILY MEDICINE

## 2020-08-03 NOTE — PROGRESS NOTES
COVID-19 Virtual Visit     Assessment/Plan:    Problem List Items Addressed This Visit        Other    Exposure to SARS-associated coronavirus - Primary     Patient's symptoms may be referable to COVID-19 infection  We discussed this  I am going to recommend that the patient come to the office for testing  We discussed office protocol  The patient has been advised that she needs to quarantine  Close contacts such as her boyfriend her mother and father should also quarantine until test results have been obtained  Patient has been instructed to use a separate bathroom from everyone else at home if this is available  She is not to leave the house until test results have been obtained  Patient has been advised to drink plenty of fluids  Take Tylenol as needed  Further instructions pending test results  Patient has been instructed to call immediately if she has any change in his symptoms, particularly if she develops shortness of breath  This virtual check-in was done via [employer] and patient was informed that this is not a secure, HIPAA-complaint platform  She agrees to proceed     Disposition:      I recommended Charlotte come to our office for a oropharyngeal swab for COVID-19    I spent 14 minutes directly with the patient during this visit    Encounter provider Reyes Reyes DO    Provider located at [address] [place] [id number]  [phone]    Recent Visits  No visits were found meeting these conditions  Showing recent visits within past 7 days and meeting all other requirements     Today's Visits  Date Type Provider Dept   08/03/20 Telemedicine Reyes Reyes DO [facility]   Showing today's visits and meeting all other requirements     Future Appointments  No visits were found meeting these conditions     Showing future appointments within next 150 days and meeting all other requirements Patient agrees to participate in a virtual check in via telephone or video visit instead of presenting to the office to address urgent/immediate medical needs  Patient is aware this is a billable service  After connecting through [employer], the patient was identified by name and date of birth  Ese David was informed that this was a telemedicine visit and that the exam was being conducted confidentially over secure lines  My office door was closed  No one else was in the room  Ese David acknowledged consent and understanding of privacy and security of the telemedicine visit  I informed the patient that I have reviewed her record in Epic and presented the opportunity for her to ask any questions regarding the visit today  The patient agreed to participate  Subjective  Ese David is a 30 y o  female who is concerned about COVID-19  This is a 79-year-old white female who works as a pharmacy tech at [employer]  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  She works in direct patient care  The patient complains of nausea with low-grade fevers  Her T-max has been 99 7  She was sent home from work 6 days ago from [employer]  This was due to her symptoms  She reports nausea and vomiting and diarrhea  She reports generalized weakness  She admits to myalgias but tells me this is is no more than normal   She reports decreased appetite  She denies nasal congestion, sore throat, rhinorrhea, sneezing  She denies chest congestion, cough, shortness of breath and wheezing  She denies anosomia  She reports fever, anorexia, fatigue, diarrhea, nausea and vomiting  She has not experienced chills, repeated shaking with chills, cough, shortness of breath, headache, sore throat, myalgias and anosmia She has not had contact with a person who is under investigation for or who is positive for COVID-19 within the last 14 days   She has not been hospitalized recently for fever and/or lower respiratory symptoms  Past Medical History:   Diagnosis Date    Arthritis     osteoarthritis    Benign paroxysmal vertigo, unspecified ear     Bilateral temporomandibular joint disorder, unspecified     Depression     Disease of thyroid gland     autoimmune thyroiditis    Dizziness and giddiness     Dyspnea     Fibromyalgia     Sicca syndrome, unspecified ([address] )     [address]jogren's syndrome [address] [address]     Spondylolysis, lumbosacral     Sprain of ligaments of cervical spine     Viral intestinal infection, unspecified        Past Surgical History:   Procedure Laterality Date    EXPLORATORY LAPAROTOMY         Current Outpatient Medications   Medication Sig Dispense Refill    buPROPion (WELLBUTRIN) 100 mg tablet Take 1 tablet (100 mg total) by mouth daily 90 tablet 3    clonazePAM (KlonoPIN) 0 25 MG disintegrating tablet Take 1 tablet (0 25 mg total) by mouth 2 (two) times a day as needed for anxiety 60 tablet 5    gabapentin (NEURONTIN) 300 mg capsule TAKE 1 CAPSULE BY MOUTH THREE TIMES DAILY 90 capsule 5    hydroxychloroquine (PLAQUENIL) 200 mg tablet Take 2 tablets (400 mg total) by mouth daily with breakfast 180 tablet 1    omeprazole (PriLOSEC) 40 MG capsule Take 1 capsule (40 mg total) by mouth daily 90 capsule 3    sertraline (ZOLOFT) 100 mg tablet Take 1 tablet (100 mg total) by mouth daily at bedtime 90 tablet 3    SRONYX 0 1-20 MG-MCG per tablet Take 1 tablet by mouth daily  3    meloxicam (MOBIC) 7 5 mg tablet Take 1 tablet (7 5 mg total) by mouth 2 (two) times a day as needed (costochondritis) (Patient not taking: Reported on 8/3/2020) 50 tablet 2     No current facility-administered medications for this visit  Allergies   Allergen Reactions    Amoxicillin Hives       Review of Systems   Constitutional: Positive for appetite change, fatigue and fever  HENT: Negative for congestion, ear pain, rhinorrhea, sinus pressure, sinus pain and sore throat      Respiratory: Negative for cough, shortness of breath and wheezing  Gastrointestinal: Positive for diarrhea, nausea and vomiting  Musculoskeletal: Negative for myalgias (Reports myalgias but no worse than normal)  Video Exam    Vitals:    08/03/20 1016   BP: 125/83   Temp: 98 7 °F (37 1 °C)   Weight: 110 kg (243 lb)         Charlotte appears alert, cooperative  Physical Exam   Constitutional:   This is a 37-year-old white female who appears her stated age  She looks ill but does not appear to be septic  She was in no distress   HENT:   Head: Normocephalic and atraumatic  Right Ear: External ear normal    Left Ear: External ear normal    Mouth/Throat: Mucous membranes are dry  No oropharyngeal exudate or posterior oropharyngeal erythema  Oropharynx is clear  Eyes: Conjunctivae are normal  Right eye exhibits no discharge  Left eye exhibits no discharge  No scleral icterus  Neck: No neck rigidity  Cardiovascular:   No cyanosis or clubbing of the digits   Lymphadenopathy:     She has no cervical adenopathy  Vitals reviewed  VIRTUAL VISIT DISCLAIMER    Kev Whitehead acknowledges that she has consented to an online visit or consultation  She understands that the online visit is based solely on information provided by her, and that, in the absence of a face-to-face physical evaluation by the physician, the diagnosis she receives is both limited and provisional in terms of accuracy and completeness  This is not intended to replace a full medical face-to-face evaluation by the physician  Kev Whitehead understands and accepts these terms

## 2020-08-03 NOTE — ASSESSMENT & PLAN NOTE
Patient's symptoms may be referable to COVID-19 infection  We discussed this  I am going to recommend that the patient come to the office for testing  We discussed office protocol  The patient has been advised that she needs to quarantine  Close contacts such as her boyfriend her mother and father should also quarantine until test results have been obtained  Patient has been instructed to use a separate bathroom from everyone else at home if this is available  She is not to leave the house until test results have been obtained  Patient has been advised to drink plenty of fluids  Take Tylenol as needed  Further instructions pending test results  Patient has been instructed to call immediately if she has any change in his symptoms, particularly if she develops shortness of breath

## 2020-08-05 LAB — SARS-COV-2 RNA SPEC QL NAA+PROBE: NOT DETECTED

## 2020-08-24 ENCOUNTER — OFFICE VISIT (OUTPATIENT)
Dept: FAMILY MEDICINE CLINIC | Facility: CLINIC | Age: 35
End: 2020-08-24
Payer: COMMERCIAL

## 2020-08-24 VITALS
SYSTOLIC BLOOD PRESSURE: 132 MMHG | HEART RATE: 74 BPM | DIASTOLIC BLOOD PRESSURE: 82 MMHG | TEMPERATURE: 98.3 F | BODY MASS INDEX: 40 KG/M2 | WEIGHT: 248.9 LBS | HEIGHT: 66 IN | OXYGEN SATURATION: 98 %

## 2020-08-24 DIAGNOSIS — R11.0 NAUSEA: ICD-10-CM

## 2020-08-24 DIAGNOSIS — M35.01 SJOGREN'S SYNDROME WITH KERATOCONJUNCTIVITIS SICCA (HCC): Primary | ICD-10-CM

## 2020-08-24 PROCEDURE — 1036F TOBACCO NON-USER: CPT | Performed by: FAMILY MEDICINE

## 2020-08-24 PROCEDURE — 99213 OFFICE O/P EST LOW 20 MIN: CPT | Performed by: FAMILY MEDICINE

## 2020-08-24 PROCEDURE — 3008F BODY MASS INDEX DOCD: CPT | Performed by: FAMILY MEDICINE

## 2020-08-24 PROCEDURE — 3725F SCREEN DEPRESSION PERFORMED: CPT | Performed by: FAMILY MEDICINE

## 2020-08-24 RX ORDER — ONDANSETRON 4 MG/1
4 TABLET, ORALLY DISINTEGRATING ORAL EVERY 6 HOURS PRN
Qty: 20 TABLET | Refills: 3 | Status: SHIPPED | OUTPATIENT
Start: 2020-08-24 | End: 2022-08-09

## 2020-08-24 RX ORDER — PREDNISONE 20 MG/1
TABLET ORAL
Qty: 26 TABLET | Refills: 0 | Status: SHIPPED | OUTPATIENT
Start: 2020-08-24 | End: 2021-01-25 | Stop reason: ALTCHOICE

## 2020-08-24 NOTE — ASSESSMENT & PLAN NOTE
Patient has Sjogren's syndrome  I ordered a sed rate and C reactive protein  After she has this drawn, she will begin prednisone  I will start her on a slow taper  She will taper down from 60 mg to 10 mg and off over a period of 16 days  His sed rate and C reactive protein her elevated, or if she does not respond to the prednisone, she is going to have to follow-up with her rheumatologist   I will contact her when I get her blood test results

## 2020-08-24 NOTE — PROGRESS NOTES
Assessment/Plan:    Sjogren's syndrome with keratoconjunctivitis sicca ([facility] [address] )  Patient has Sjogren's syndrome  I ordered a sed rate and C reactive protein  After she has this drawn, she will begin prednisone  I will start her on a slow taper  She will taper down from 60 mg to 10 mg and off over a period of 16 days  His sed rate and C reactive protein her elevated, or if she does not respond to the prednisone, she is going to have to follow-up with her rheumatologist   I will contact her when I get her blood test results  Diagnoses and all orders for this visit:    Sjogren's syndrome with keratoconjunctivitis sicca (HCC)  -     C-reactive protein; Future  -     Sedimentation rate, automated; Future  -     predniSONE 20 mg tablet; Take 3 daily x 4 days, 2 daily x 4 days, 1 daily x 4 days, 1/2 daily x 4 days    Nausea  -     ondansetron (ZOFRAN-ODT) 4 mg disintegrating tablet; Take 1 tablet (4 mg total) by mouth every 6 (six) hours as needed for nausea or vomiting        BMI Counseling: Body mass index is 40 17 kg/m²  The BMI is above normal  Nutrition recommendations include reducing portion sizes, decreasing overall calorie intake, 3-5 servings of fruits/vegetables daily, reducing fast food intake, consuming healthier snacks, decreasing soda and/or juice intake and moderation in carbohydrate intake  Exercise recommendations include exercising 3-5 times per week  Subjective:      Patient ID: Steff Jett is a 30 y o  female  This patient is a 79-year-old white female with a known history of Sjogren syndrome  She presents to the office today complaining of a 2-3 month history of increased dizziness which she describes as being lightheaded off balance  It is associated with nausea and myalgias  She typically gets this way when she has flare ups of her Sjogren's syndrome  She tells me she has been missing a lot of time from work  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 [employer]    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  The following portions of the patient's history were reviewed and updated as appropriate: allergies, current medications, past family history, past medical history, past social history, past surgical history and problem list     Review of Systems   HENT:        Reports dryness of the mouth   Eyes: Positive for pain and visual disturbance  Reports dryness of the eyes   Cardiovascular: Negative for chest pain, palpitations and leg swelling  Musculoskeletal: Positive for myalgias  Negative for arthralgias  Objective:      /82 (BP Location: Left arm, Patient Position: Sitting, Cuff Size: Large)   Pulse 74   Temp 98 3 °F (36 8 °C) (Temporal)   Ht 5' 6" (1 676 m)   Wt 113 kg (248 lb 14 4 oz)   SpO2 98%   BMI 40 17 kg/m²          Physical Exam  Vitals signs reviewed  Constitutional:       Comments: This is a 74-year-old obese white female who appears her stated age  She was pleasant, cooperative, and in no distress   HENT:      Head: Normocephalic and atraumatic  Right Ear: Tympanic membrane, ear canal and external ear normal       Left Ear: Tympanic membrane, ear canal and external ear normal       Mouth/Throat:      Mouth: Mucous membranes are moist       Pharynx: Oropharynx is clear  No oropharyngeal exudate  Eyes:      General: No scleral icterus  Right eye: No discharge  Left eye: No discharge  Conjunctiva/sclera: Conjunctivae normal       Pupils: Pupils are equal, round, and reactive to light  Neck:      Musculoskeletal: Neck supple  Comments: No thyromegaly is noted  Cardiovascular:      Rate and Rhythm: Normal rate and regular rhythm  Heart sounds: Normal heart sounds  No murmur  No gallop  Pulmonary:      Effort: Pulmonary effort is normal  No respiratory distress  Breath sounds: Normal breath sounds  No stridor  No wheezing, rhonchi or rales     Musculoskeletal: Normal range of motion  General: No swelling, tenderness, deformity or signs of injury  Comments: No joint swelling, erythema, heat to the touch, or tenderness  Lymphadenopathy:      Cervical: No cervical adenopathy  Psychiatric:         Mood and Affect: Mood normal          Behavior: Behavior normal          Thought Content:  Thought content normal          Judgment: Judgment normal        extremities:  Without cyanosis, clubbing, or edema

## 2020-08-24 NOTE — PATIENT INSTRUCTIONS
Sjogren Syndrome   AMBULATORY CARE:   Sjogren syndrome  is a disease that causes your immune system to attack healthy cells in your body  Tear and saliva glands are the most common areas to be attacked  Sjogren syndrome may happen alone  This is called primary Sjogren syndrome  Secondary Sjogren syndrome means you also have an autoimmune disease, such as lupus or arthritis  Common signs and symptoms of Sjogren syndrome:   · Dry eyes, or eyes that burn or itch    · Blurry vision, or sensitivity to light    · A dry mouth, or a feeling of cotton in your mouth    · Trouble swallowing, speaking, or tasting, or swollen glands in your throat    · Cavities in your teeth or mouth infections    · Pain in your muscles or joints, numb or tingling feeling in your arms or legs    · Dry skin or a rash on your arms or legs    · Dry cough, feeling tired every day, or a low fever    · In women, vaginal dryness  Contact your healthcare provider if:   · You have new or worsening symptoms  · Your symptoms do not get better even with treatment  · You have questions or concerns about your condition or care  For dry eye problems:   · Blink often  You keep your eyes moist each time you blink  Remember to blink often, especially when you are working on a computer  · Protect your eyes  Cover your eyes when you are in cold or windy weather  You can also use a humidifier to help keep the air moist  Avoid anything that irritates your eyes, such as smoke  · Use artificial tears as needed  These are drops or gels you can put in your eyes  Artificial tears help your eyes stay moist  You may be give a prescription, or your healthcare provider may tell you to use over-the-counter types  You might need to use artificial tears that do not contain preservatives  These can irritate your eyes  · Use an ointment at night  An ointment that is thicker than tears may also be helpful  They can make your vision blurry for a time   It may help to use them only before you sleep  · Tear duct plugs may help keep tears in your eyes  Your healthcare provider can put the plugs into the ducts  The plugs prevent tears from draining out of your eyes too quickly  · Steroid drops or surgery may be needed  Steroids or surgery may help if your symptoms are severe  These are usually only needed if other treatments do not work  For dry mouth problems:   · Keep your mouth moist   Chew gum or suck on candy to help produce saliva  Choose gum or candy that does not contain sugar  Dry mouth increases your risk for cavities  Sugar in gum or candy can make your risk even higher  Take sips of water during the day  Try to wet your mouth each time you take a drink  Do not take sips often  You can remove mucus inside your mouth if you sip water too often during the day  The mucus coating in your mouth help keep it moist      · Go to the dentist regularly  Your dentist can check for cavities and clean your teeth  Tell your doctor that you have Sjogren syndrome  Go to the dentist twice each year and if you have any pain or other problems in your mouth  Your dentist may prescribe fluoride supplements or a gel you can apply to your teeth at night  You may also be able to have a coating applied to your teeth to protect the enamel  · Brush and floss your teeth  Use a toothpaste that contains fluoride  Brush your teeth, gums, and tongue  Brush after each meal and before you go to bed  Floss after your brush  · Apply lip balm if your lips are dry or cracked  Ask your healthcare provider what kind of lip balm to use  · Rinse your mouth with water each day  Do not use any rinse or mouthwash that contains alcohol  Alcohol can dry your mouth  Your healthcare provider may prescribe a rinse or gel to use if you have pain or inflammation in your mouth  What else you can do to manage Sjogren syndrome:   · Be careful with your voice    You may be hoarse if your vocal cords are swollen or get irritated from a dry throat or coughing  Do not clear your throat before you talk  Drink some water or use gum or candy to prevent hoarseness  You can also make an "h" sound to clear your throat gently  · Ask about liquids  Depending on your symptoms and other diseases, your healthcare provider may tell you to increase or decrease the amount of liquid you drink  Liquids prevent dehydration, but too much liquid can increase urination  Increased urination can lead to dryness  Limit or do not drink alcohol as directed  Alcohol can increase dryness  · Exercise as directed  Exercise can increase your energy  Exercise can also help keep your joints limber if you have an autoimmune disease such as arthritis  Your healthcare provider can help you create an exercise plan  Ask how often to exercise and which exercises are best for you  · Eat a variety of healthy foods  Healthy foods include fruits, vegetables, low-fat dairy products, lean meats, fish, whole-grain breads, and cooked beans  Limit foods that are high in sodium (salt)  Salt can lead to dehydration and increase dryness  Follow up with your healthcare provider as directed:  Write down your questions so you remember to ask them during your visits  © 2017 [address]  Information is for End User's use only and may not be sold, redistributed or otherwise used for commercial purposes  All illustrations and images included in CareNotes® are the copyrighted property of A D A M , Inc  or [employer]  The above information is an  only  It is not intended as medical advice for individual conditions or treatments  Talk to your doctor, nurse or pharmacist before following any medical regimen to see if it is safe and effective for you

## 2020-08-25 ENCOUNTER — HOSPITAL ENCOUNTER (EMERGENCY)
Facility: HOSPITAL | Age: 35
Discharge: HOME | End: 2020-08-25
Attending: EMERGENCY MEDICINE | Admitting: EMERGENCY MEDICINE
Payer: COMMERCIAL

## 2020-08-25 ENCOUNTER — TELEPHONE (OUTPATIENT)
Dept: ADMINISTRATIVE | Facility: OTHER | Age: 35
End: 2020-08-25

## 2020-08-25 VITALS
TEMPERATURE: 97.9 F | OXYGEN SATURATION: 97 % | HEIGHT: 67 IN | HEART RATE: 60 BPM | SYSTOLIC BLOOD PRESSURE: 135 MMHG | BODY MASS INDEX: 39.24 KG/M2 | RESPIRATION RATE: 18 BRPM | WEIGHT: 250 LBS | DIASTOLIC BLOOD PRESSURE: 83 MMHG

## 2020-08-25 DIAGNOSIS — K08.89 PAIN, DENTAL: Primary | ICD-10-CM

## 2020-08-25 PROCEDURE — 99284 EMERGENCY DEPT VISIT MOD MDM: CPT | Performed by: EMERGENCY MEDICINE

## 2020-08-25 PROCEDURE — 99282 EMERGENCY DEPT VISIT SF MDM: CPT

## 2020-08-25 RX ORDER — BUPIVACAINE HYDROCHLORIDE 5 MG/ML
5 INJECTION, SOLUTION EPIDURAL; INTRACAUDAL ONCE
Status: COMPLETED | OUTPATIENT
Start: 2020-08-25 | End: 2020-08-25

## 2020-08-25 RX ORDER — CLINDAMYCIN HYDROCHLORIDE 150 MG/1
300 CAPSULE ORAL ONCE
Status: COMPLETED | OUTPATIENT
Start: 2020-08-25 | End: 2020-08-25

## 2020-08-25 RX ORDER — CLINDAMYCIN HYDROCHLORIDE 150 MG/1
300 CAPSULE ORAL EVERY 8 HOURS
Qty: 42 CAPSULE | Refills: 0 | Status: SHIPPED | OUTPATIENT
Start: 2020-08-25 | End: 2020-09-01

## 2020-08-25 RX ADMIN — BUPIVACAINE HYDROCHLORIDE 5 ML: 5 INJECTION, SOLUTION EPIDURAL; INTRACAUDAL at 21:54

## 2020-08-25 RX ADMIN — CLINDAMYCIN HYDROCHLORIDE 300 MG: 150 CAPSULE ORAL at 21:54

## 2020-08-25 NOTE — TELEPHONE ENCOUNTER
Upon review of the In Basket request and the patient's chart, initial outreach has been made via fax, please see Contacts section for details  A second outreach attempt will be made within 5 business days      Thank you  Grayce Hodgkin, MA

## 2020-08-25 NOTE — LETTER
Procedure Request Form: Cervical Cancer Screening      Date Requested: 20  Patient: Mekhi Cones  Patient : 1985   Referring Provider: Michoacano Pee, DO        Date of Procedure ______________________________       The above patient has informed us that they have completed their   most recent Cervical Cancer Screening at your facility  Please complete   this form and attach all corresponding procedure reports/results  Comments __________________________________________________________  ____________________________________________________________________  ____________________________________________________________________  ____________________________________________________________________    Facility Completing Procedure _________________________________________    Form Completed By (print name) _______________________________________      Signature __________________________________________________________      These reports are needed for  compliance    Please fax this completed form and a copy of the procedure report to our office located at [place] [address] as soon as possible to [phone] eduin Zamora: Phone [phone]    We thank you for your assistance in treating our mutual patient

## 2020-08-25 NOTE — TELEPHONE ENCOUNTER
----- Message from Vita Ambriz MA sent at 8/24/2020  2:48 PM EDT -----  Regarding: cervical screening [facility]  08/24/20 2:48 PM    Hello, our patient Tom Becker has had Pap Smear (HPV) aka Cervical Cancer Screening completed/performed  Please assist in updating the patient chart by making an External outreach to Dr Andrew Crews facility located in [place]  The date of service is 2019      Thank you,  Vita Ambriz MA  [facility]

## 2020-08-27 ENCOUNTER — VBI (OUTPATIENT)
Dept: ADMINISTRATIVE | Facility: OTHER | Age: 35
End: 2020-08-27

## 2020-08-27 NOTE — TELEPHONE ENCOUNTER
Tom Becker    ED Visit Information     Ed visit date: 8/25/20   Diagnosis Description: Pain, dental     In Network? Yes [facility] [facility]                                                                                                                                                                                                                                                                                                            Discharge status: Home  Discharged with meds ? Yes  Number of ED visits to date: 2  ED Severity:5     Outreach Information    Outreach successful: No 2  Date letter mailed:0 C-19 n/a  Date Finalized:8/28/20    Care Coordination    Follow up appointment with pcp: no 0  Transportation issues ?  NA    Value [employer] type:  3 Day Outreach  [facility] PCP:  Yes  Practice Contacted Patient ?:  No  Pt had ED follow up with pcp/staff ?:  No    08/27/2020 08:37 AM Phone (VBI) Mehdi Adames (Self) [phone] Maxime Zavala) Remove  Not Available - on behalf of [facility]  not accept calls    By Drew Mancia    08/28/2020 01:41 PM Phone (VBI) Mehdi Adames (Self) [phone] Maxime Zavala) Remove  Not Available - not accepting calls    By Drew Mancia

## 2020-08-28 NOTE — ED PROVIDER NOTES
History  Chief Complaint   Patient presents with    Dental Pain     This is 30year old female with chief complaint left sided dental pain  Worsening for the past few days  She rates the pain as severe and non radiating  Worse with palpation and temperature fluctuation, nothing makes it better  Has been trying otc medications  She has had difficulty obtaining a dental appointment  Is not having fever, chills, nausea, vomiting, facial swelling, difficulty swallowing, or change in voice  Prior to Admission Medications   Prescriptions Last Dose Informant Patient Reported? Taking?    SRONYX 0 1-20 MG-MCG per tablet  Self Yes No   Sig: Take 1 tablet by mouth daily   buPROPion (WELLBUTRIN) 100 mg tablet  Self No No   Sig: Take 1 tablet (100 mg total) by mouth daily   clonazePAM (KlonoPIN) 0 25 MG disintegrating tablet  Self No No   Sig: Take 1 tablet (0 25 mg total) by mouth 2 (two) times a day as needed for anxiety   gabapentin (NEURONTIN) 300 mg capsule  Self No No   Sig: TAKE 1 CAPSULE BY MOUTH THREE TIMES DAILY   hydroxychloroquine (PLAQUENIL) 200 mg tablet  Self No No   Sig: Take 2 tablets (400 mg total) by mouth daily with breakfast   meloxicam (MOBIC) 7 5 mg tablet  Self No No   Sig: Take 1 tablet (7 5 mg total) by mouth 2 (two) times a day as needed (costochondritis)   omeprazole (PriLOSEC) 40 MG capsule  Self No No   Sig: Take 1 capsule (40 mg total) by mouth daily   ondansetron (ZOFRAN-ODT) 4 mg disintegrating tablet   No No   Sig: Take 1 tablet (4 mg total) by mouth every 6 (six) hours as needed for nausea or vomiting   predniSONE 20 mg tablet   No No   Sig: Take 3 daily x 4 days, 2 daily x 4 days, 1 daily x 4 days, 1/2 daily x 4 days   sertraline (ZOLOFT) 100 mg tablet  Self No No   Sig: Take 1 tablet (100 mg total) by mouth daily at bedtime      Facility-Administered Medications: None       Past Medical History:   Diagnosis Date    Arthritis     osteoarthritis    Benign paroxysmal vertigo, unspecified ear     Bilateral temporomandibular joint disorder, unspecified     Depression     Disease of thyroid gland     autoimmune thyroiditis    Dizziness and giddiness     Dyspnea     Fibromyalgia     Sicca syndrome, unspecified ([address] )     Sjogren's syndrome (HCC)     Spondylolysis, lumbosacral     Sprain of ligaments of cervical spine     Viral intestinal infection, unspecified        Past Surgical History:   Procedure Laterality Date    EXPLORATORY LAPAROTOMY         Family History   Problem Relation Age of Onset    Arthritis Mother     Atrial fibrillation Father     Emphysema Father     Lopez's esophagus Father     JULIO disease Father     Hypertension Father     Diabetes Father     Endometriosis Sister     Fibromyalgia Brother     Neuropathy Brother     Anxiety disorder Brother     Depression Brother     JULIO disease Brother      I have reviewed and agree with the history as documented  E-Cigarette/Vaping    E-Cigarette Use Never User      E-Cigarette/Vaping Substances    Nicotine No     THC No     CBD No     Flavoring No     Other No     Unknown No      Social History     Tobacco Use    Smoking status: Never Smoker    Smokeless tobacco: Never Used   Substance Use Topics    Alcohol use: Not Currently     Frequency: Monthly or less     Drinks per session: 1 or 2    Drug use: Never       Review of Systems   Constitutional: Negative for activity change, chills, fatigue and fever  HENT: Negative for congestion  Eyes: Negative for visual disturbance  Respiratory: Negative for cough, chest tightness and shortness of breath  Cardiovascular: Negative for chest pain  Gastrointestinal: Negative for abdominal pain, diarrhea and vomiting  Genitourinary: Negative for dysuria  Skin: Negative for rash  Allergic/Immunologic: Negative for immunocompromised state  Neurological: Negative for dizziness, weakness and numbness         Physical Exam  Physical Exam  Constitutional:       Appearance: She is well-developed  HENT:      Head: Normocephalic and atraumatic  Mouth/Throat:      Comments: Multiple damaged teeth, left lwer  Molar is especially damaged and tender  No obvious abscess  Midline uvula  Eyes:      Conjunctiva/sclera: Conjunctivae normal       Pupils: Pupils are equal, round, and reactive to light  Neck:      Musculoskeletal: Normal range of motion and neck supple  Cardiovascular:      Rate and Rhythm: Normal rate and regular rhythm  Heart sounds: Normal heart sounds  Pulmonary:      Effort: Pulmonary effort is normal  No respiratory distress  Breath sounds: Normal breath sounds  No stridor  No wheezing, rhonchi or rales  Abdominal:      General: Bowel sounds are normal  There is no distension  Palpations: Abdomen is soft  Tenderness: There is no guarding  Musculoskeletal: Normal range of motion  Skin:     General: Skin is warm and dry  Capillary Refill: Capillary refill takes less than 2 seconds  Neurological:      General: No focal deficit present  Mental Status: She is alert and oriented to person, place, and time  Psychiatric:         Mood and Affect: Mood normal          Behavior: Behavior normal          Thought Content:  Thought content normal          Judgment: Judgment normal          Vital Signs  ED Triage Vitals [08/25/20 2139]   Temperature Pulse Respirations Blood Pressure SpO2   97 9 °F (36 6 °C) 61 18 136/79 100 %      Temp Source Heart Rate Source Patient Position - Orthostatic VS BP Location FiO2 (%)   Temporal Monitor Sitting Left arm --      Pain Score       Worst Possible Pain           Vitals:    08/25/20 2139 08/25/20 2210   BP: 136/79 135/83   Pulse: 61 60   Patient Position - Orthostatic VS: Sitting          Visual Acuity      ED Medications  Medications   clindamycin (CLEOCIN) capsule 300 mg (300 mg Oral Given 8/25/20 2154)   bupivacaine (PF) (MARCAINE) 0 5 % injection 5 mL (5 mL Infiltration Given by Other 8/25/20 2154)       Diagnostic Studies  Results Reviewed     None                 No orders to display              Procedures  Procedures         ED Course       US AUDIT      Most Recent Value   Initial Alcohol Screen: US AUDIT-C    1  How often do you have a drink containing alcohol?  0 Filed at: 08/25/2020 2141   Audit-C Score  0 Filed at: 08/25/2020 2141                  PRICE/DAST-10      Most Recent Value   How many times in the past year have you    Used an illegal drug or used a prescription medication for non-medical reasons? Never Filed at: 08/25/2020 2141                                MDM  Number of Diagnoses or Management Options  Pain, dental: new and requires workup  Diagnosis management comments: This is a 30year old female with dental pain  Started on clindamycin in the ed, with a script given  Patient appears clinically well  Discussed warning signs and symptoms with the patient as well as when to return to the emergency department versus follow up with PC P  Patient states understanding and agreement with the plan  Disposition  Final diagnoses:   Pain, dental     Time reflects when diagnosis was documented in both MDM as applicable and the Disposition within this note     Time User Action Codes Description Comment    8/25/2020  9:40 PM Dexter Mendiola Add [K08 89] Pain, dental       ED Disposition     ED Disposition Condition Date/Time Comment    Discharge Stable Tue Aug 25, 2020  9:40 PM  Shows discharge to home/self care              Follow-up Information    None         Discharge Medication List as of 8/25/2020 10:06 PM      START taking these medications    Details   clindamycin (CLEOCIN) 150 mg capsule Take 2 capsules (300 mg total) by mouth every 8 (eight) hours for 7 days, Starting Tue 8/25/2020, Until Tue 9/1/2020, Normal         CONTINUE these medications which have NOT CHANGED    Details   buPROPion (WELLBUTRIN) 100 mg tablet Take 1 tablet (100 mg total) by mouth daily, Starting Mon 3/2/2020, Normal      clonazePAM (KlonoPIN) 0 25 MG disintegrating tablet Take 1 tablet (0 25 mg total) by mouth 2 (two) times a day as needed for anxiety, Starting Mon 3/2/2020, Normal      gabapentin (NEURONTIN) 300 mg capsule TAKE 1 CAPSULE BY MOUTH THREE TIMES DAILY, Normal      hydroxychloroquine (PLAQUENIL) 200 mg tablet Take 2 tablets (400 mg total) by mouth daily with breakfast, Starting Mon 3/16/2020, Until Sat 9/12/2020, Normal      meloxicam (MOBIC) 7 5 mg tablet Take 1 tablet (7 5 mg total) by mouth 2 (two) times a day as needed (costochondritis), Starting Wed 2/19/2020, Normal      omeprazole (PriLOSEC) 40 MG capsule Take 1 capsule (40 mg total) by mouth daily, Starting Mon 3/2/2020, Normal      ondansetron (ZOFRAN-ODT) 4 mg disintegrating tablet Take 1 tablet (4 mg total) by mouth every 6 (six) hours as needed for nausea or vomiting, Starting Mon 8/24/2020, Normal      predniSONE 20 mg tablet Take 3 daily x 4 days, 2 daily x 4 days, 1 daily x 4 days, 1/2 daily x 4 days, Normal      sertraline (ZOLOFT) 100 mg tablet Take 1 tablet (100 mg total) by mouth daily at bedtime, Starting Mon 3/2/2020, Normal      SRONYX 0 1-20 MG-MCG per tablet Take 1 tablet by mouth daily, Starting Tue 11/19/2019, Historical Med               PDMP Review       Value Time User    PDMP Reviewed  Yes 3/2/2020  1:34 PM Parish Brennan DO ED Provider  Electronically Signed by           Georgeana Kocher, MD  08/27/20 0564

## 2020-09-01 ENCOUNTER — APPOINTMENT (OUTPATIENT)
Dept: LAB | Facility: HOSPITAL | Age: 35
End: 2020-09-01
Payer: COMMERCIAL

## 2020-09-01 DIAGNOSIS — M35.01 SJOGREN'S SYNDROME WITH KERATOCONJUNCTIVITIS SICCA (HCC): ICD-10-CM

## 2020-09-01 LAB
CRP SERPL QL: 6.6 MG/L
ERYTHROCYTE [SEDIMENTATION RATE] IN BLOOD: 30 MM/HOUR (ref 0–19)

## 2020-09-01 PROCEDURE — 86140 C-REACTIVE PROTEIN: CPT

## 2020-09-01 PROCEDURE — 36415 COLL VENOUS BLD VENIPUNCTURE: CPT

## 2020-09-01 PROCEDURE — 85652 RBC SED RATE AUTOMATED: CPT

## 2020-09-01 NOTE — TELEPHONE ENCOUNTER
Upon review of the In Basket request we were able to locate, review, and update the patient chart as requested for Pap Smear (HPV) aka Cervical Cancer Screening  Any additional questions or concerns should be emailed to the Practice Liaisons via [email or web address]  [email or web address] email, please do not reply via In Basket      Thank you  Ana Morocho MA

## 2020-09-22 ENCOUNTER — TELEPHONE (OUTPATIENT)
Dept: FAMILY MEDICINE CLINIC | Facility: CLINIC | Age: 35
End: 2020-09-22

## 2020-09-22 DIAGNOSIS — K04.7 DENTAL ABSCESS: Primary | ICD-10-CM

## 2020-09-22 RX ORDER — AZITHROMYCIN 250 MG/1
TABLET, FILM COATED ORAL
Qty: 6 TABLET | Refills: 0 | Status: SHIPPED | OUTPATIENT
Start: 2020-09-22 | End: 2020-09-27

## 2020-09-22 NOTE — TELEPHONE ENCOUNTER
PATIENT CALLED ASKING IF YOU WOULD CALL SOMETHING IN FOR AN INFECTION FOR A BAD TOOTH  SHE DOES HAVE AN APPOINTMENT WITH A DENTIST BUT THEY WILL NOT CALL ANYTHING IN UNTIL THEY SEE HER  THEY TOLD HER TO CALL HER FAMILY DR SHI[address]

## 2020-10-30 ENCOUNTER — HOSPITAL ENCOUNTER (EMERGENCY)
Facility: HOSPITAL | Age: 35
Discharge: HOME | End: 2020-10-30
Attending: EMERGENCY MEDICINE | Admitting: EMERGENCY MEDICINE
Payer: COMMERCIAL

## 2020-10-30 VITALS
RESPIRATION RATE: 18 BRPM | BODY MASS INDEX: 39.24 KG/M2 | WEIGHT: 250 LBS | OXYGEN SATURATION: 100 % | HEART RATE: 86 BPM | SYSTOLIC BLOOD PRESSURE: 148 MMHG | HEIGHT: 67 IN | DIASTOLIC BLOOD PRESSURE: 73 MMHG

## 2020-10-30 DIAGNOSIS — L50.9 URTICARIA: Primary | ICD-10-CM

## 2020-10-30 PROCEDURE — 99282 EMERGENCY DEPT VISIT SF MDM: CPT

## 2020-10-30 PROCEDURE — 99284 EMERGENCY DEPT VISIT MOD MDM: CPT | Performed by: EMERGENCY MEDICINE

## 2020-10-30 RX ORDER — PREDNISONE 50 MG/1
50 TABLET ORAL DAILY
Qty: 5 TABLET | Refills: 0 | Status: SHIPPED | OUTPATIENT
Start: 2020-10-30 | End: 2020-11-04

## 2020-10-30 RX ORDER — DIPHENHYDRAMINE HCL 25 MG
25 TABLET ORAL EVERY 6 HOURS
Qty: 20 TABLET | Refills: 0 | Status: SHIPPED | OUTPATIENT
Start: 2020-10-30 | End: 2021-04-30

## 2020-10-30 RX ORDER — FAMOTIDINE 20 MG/1
20 TABLET, FILM COATED ORAL 2 TIMES DAILY
Qty: 20 TABLET | Refills: 0 | Status: SHIPPED | OUTPATIENT
Start: 2020-10-30 | End: 2021-04-30

## 2020-10-30 RX ORDER — PREDNISONE 20 MG/1
60 TABLET ORAL ONCE
Status: COMPLETED | OUTPATIENT
Start: 2020-10-30 | End: 2020-10-30

## 2020-10-30 RX ORDER — FAMOTIDINE 20 MG/1
20 TABLET, FILM COATED ORAL ONCE
Status: COMPLETED | OUTPATIENT
Start: 2020-10-30 | End: 2020-10-30

## 2020-10-30 RX ORDER — DIPHENHYDRAMINE HCL 25 MG
25 TABLET ORAL ONCE
Status: COMPLETED | OUTPATIENT
Start: 2020-10-30 | End: 2020-10-30

## 2020-10-30 RX ADMIN — FAMOTIDINE 20 MG: 20 TABLET, FILM COATED ORAL at 01:14

## 2020-10-30 RX ADMIN — DIPHENHYDRAMINE HCL 25 MG: 25 TABLET ORAL at 01:18

## 2020-10-30 RX ADMIN — PREDNISONE 60 MG: 20 TABLET ORAL at 01:15

## 2020-11-02 ENCOUNTER — VBI (OUTPATIENT)
Dept: FAMILY MEDICINE CLINIC | Facility: CLINIC | Age: 35
End: 2020-11-02

## 2020-12-07 ENCOUNTER — TELEPHONE (OUTPATIENT)
Dept: FAMILY MEDICINE CLINIC | Facility: CLINIC | Age: 35
End: 2020-12-07

## 2020-12-07 DIAGNOSIS — Z20.822 EXPOSURE TO COVID-19 VIRUS: ICD-10-CM

## 2020-12-07 DIAGNOSIS — Z20.822 EXPOSURE TO COVID-19 VIRUS: Primary | ICD-10-CM

## 2020-12-07 PROCEDURE — U0003 INFECTIOUS AGENT DETECTION BY NUCLEIC ACID (DNA OR RNA); SEVERE ACUTE RESPIRATORY SYNDROME CORONAVIRUS 2 (SARS-COV-2) (CORONAVIRUS DISEASE [COVID-19]), AMPLIFIED PROBE TECHNIQUE, MAKING USE OF HIGH THROUGHPUT TECHNOLOGIES AS DESCRIBED BY CMS-2020-01-R: HCPCS | Performed by: FAMILY MEDICINE

## 2020-12-09 LAB — SARS-COV-2 RNA SPEC QL NAA+PROBE: NOT DETECTED

## 2021-01-21 NOTE — PROGRESS NOTES
Assessment and Plan:   Patient is a 56-year-old female who presents for rheumatology follow-up for Sjogren syndrome  Reports over the last few months she has had intermittent issues with increased joint and muscle, which resolved with high dose of, which is at a  She also had a suspected episode of costochondritis that improved  She is now back to her baseline in feels she is doing  She is still on the and states she is up-to-date with her eye exam which has been  She is using Restasis and a rewetting spray for  We will not make any changes today and will plan to do blood work prior to her next follow-up visit  We will follow-up in 1 year or sooner if there are issues  Plan:  Diagnoses and all orders for this visit:    Sjogren's syndrome with keratoconjunctivitis sicca (HCC)  -     C3 complement  -     C4 complement  -     CBC and differential  -     C-reactive protein  -     Sedimentation rate, automated  -     Comprehensive metabolic panel  -     Protein electrophoresis, serum    Long-term use of Plaquenil  -     C3 complement  -     C4 complement  -     CBC and differential  -     C-reactive protein  -     Sedimentation rate, automated  -     Comprehensive metabolic panel  -     Protein electrophoresis, serum    Sicca syndrome (HCC)        Follow-up plan: 1 year        Rheumatic Disease Summary  1  Sjogren's syndrome  -Est with me 8/5/19 for Sjogrens with prior borderline +SSB, previously dx by Dr Cat Toth 2016 and he started plaquenil which improved joint pain and fatigue; also saw [facility] rheum Dr Jaime Clements in 2017  -Labs 9/2018: +SSB 1 4, neg SSA Serologies: Negative  EKATERINA, RF, CCP, Marx, RNP, C3, C4, dsDNA  -Presented on plaquenil and Restasis, stable  -Labs 8/2019: +SSB 1 4, negative EKATERINA, SSA, dsDNA, smith, RNP, RF, CCP, hep panel, normal C3/4, CRP 5 4, ESR 19, Vit D 20  -Visit 3/16/20: stable on plaquenil, no changes   -Visit 1/25/21: stable on plaquenil and restasis eye drops  2  Fibromyalgia  -Gabapentin started 8/2019 with improvement   -XRs lumbar/SI joints 8/2019: OA and DDD, no inflammatory changes   3  Comorbidities: GERD, anxiety, depression, endometriosis     HEIDY Guerrero is a 37 y o   female who presents for rheumatology follow-up for Sjogren syndrome  She has been stable on Plaquenil therapy for quite some time  States she had an episode of suspected costochondritis a few months ago, treated with PCP with meloxicam and this resolved it  She still uses the meloxicam just as needed  She also had a prednisone taper starting at a dose of 60 mg in august for generalized increase in joint and muscle and this helped her at that time  Currently she feels she is back to her baseline and doing well again  She denies any significant complaints about joint pain and has not had any joint swelling  She is struggling with dry mouth and did by a mouth spray for this reason  She has a dry nasal passage  Her dry eyes have improved Restasis eyedrops  Denies any color changes of her fingers in the cold  The following portions of the patient's history were reviewed and updated as appropriate: allergies, current medications, past family history, past medical history, past social history, past surgical history and problem list     Review of Systems:   Review of Systems   Constitutional: Negative for fatigue and unexpected weight change  HENT: Negative for mouth sores  +dry mouth   Respiratory: Negative for cough and shortness of breath  Gastrointestinal: Negative for constipation and diarrhea  Musculoskeletal: Negative for arthralgias, back pain, joint swelling and myalgias  Skin: Negative for color change and rash  Neurological: Negative for weakness  Psychiatric/Behavioral: Negative for sleep disturbance         Home Medications:    Current Outpatient Medications:     buPROPion (WELLBUTRIN) 100 mg tablet, Take 1 tablet (100 mg total) by mouth daily, Disp: 90 tablet, Rfl: 3    clonazePAM (KlonoPIN) 0 25 MG disintegrating tablet, Take 1 tablet (0 25 mg total) by mouth 2 (two) times a day as needed for anxiety, Disp: 60 tablet, Rfl: 5    diphenhydrAMINE (BENADRYL) 25 mg tablet, Take 1 tablet (25 mg total) by mouth every 6 (six) hours, Disp: 20 tablet, Rfl: 0    gabapentin (NEURONTIN) 300 mg capsule, TAKE 1 CAPSULE BY MOUTH THREE TIMES DAILY, Disp: 90 capsule, Rfl: 5    meloxicam (MOBIC) 7 5 mg tablet, Take 1 tablet (7 5 mg total) by mouth 2 (two) times a day as needed (costochondritis), Disp: 50 tablet, Rfl: 2    omeprazole (PriLOSEC) 40 MG capsule, Take 1 capsule (40 mg total) by mouth daily, Disp: 90 capsule, Rfl: 3    ondansetron (ZOFRAN-ODT) 4 mg disintegrating tablet, Take 1 tablet (4 mg total) by mouth every 6 (six) hours as needed for nausea or vomiting, Disp: 20 tablet, Rfl: 3    sertraline (ZOLOFT) 100 mg tablet, Take 1 tablet (100 mg total) by mouth daily at bedtime, Disp: 90 tablet, Rfl: 3    SRONYX 0 1-20 MG-MCG per tablet, Take 1 tablet by mouth daily, Disp: , Rfl: 3    famotidine (PEPCID) 20 mg tablet, Take 1 tablet (20 mg total) by mouth 2 (two) times a day for 10 days, Disp: 20 tablet, Rfl: 0    hydroxychloroquine (PLAQUENIL) 200 mg tablet, Take 2 tablets (400 mg total) by mouth daily with breakfast, Disp: 180 tablet, Rfl: 1    Objective:    Vitals:    01/25/21 1031   BP: 134/82   Pulse: 84   Height: 5' 7" (1 702 m)       Physical Exam  Constitutional:       General: She is not in acute distress  Appearance: She is well-developed  HENT:      Head: Normocephalic and atraumatic  Eyes:      General: Lids are normal  No scleral icterus  Conjunctiva/sclera: Conjunctivae normal    Neck:      Musculoskeletal: Neck supple  Pulmonary:      Effort: Pulmonary effort is normal  No tachypnea, accessory muscle usage or respiratory distress  Skin:     General: Skin is dry  Findings: No rash  Neurological:      Mental Status: She is alert  Psychiatric:         Behavior: Behavior normal  Behavior is cooperative           Labs:   Component      Latest Ref Rng & Units 9/1/2020   C-REACTIVE PROTEIN      <7 5 mg/L 6 6   Sed Rate      0 - 19 mm/hour 30 (H)

## 2021-01-25 ENCOUNTER — OFFICE VISIT (OUTPATIENT)
Dept: RHEUMATOLOGY | Facility: CLINIC | Age: 36
End: 2021-01-25
Payer: COMMERCIAL

## 2021-01-25 VITALS
BODY MASS INDEX: 39.16 KG/M2 | HEIGHT: 67 IN | SYSTOLIC BLOOD PRESSURE: 134 MMHG | HEART RATE: 84 BPM | DIASTOLIC BLOOD PRESSURE: 82 MMHG

## 2021-01-25 DIAGNOSIS — M35.01 SJOGREN'S SYNDROME WITH KERATOCONJUNCTIVITIS SICCA (HCC): Primary | ICD-10-CM

## 2021-01-25 DIAGNOSIS — M35.00 SICCA SYNDROME (HCC): ICD-10-CM

## 2021-01-25 DIAGNOSIS — Z79.899 LONG-TERM USE OF PLAQUENIL: ICD-10-CM

## 2021-01-25 PROCEDURE — 99214 OFFICE O/P EST MOD 30 MIN: CPT | Performed by: INTERNAL MEDICINE

## 2021-01-25 PROCEDURE — 1036F TOBACCO NON-USER: CPT | Performed by: INTERNAL MEDICINE

## 2021-05-03 ENCOUNTER — OFFICE VISIT (OUTPATIENT)
Dept: FAMILY MEDICINE CLINIC | Facility: CLINIC | Age: 36
End: 2021-05-03
Payer: COMMERCIAL

## 2021-05-03 VITALS
HEART RATE: 82 BPM | WEIGHT: 262.5 LBS | HEIGHT: 67 IN | DIASTOLIC BLOOD PRESSURE: 90 MMHG | SYSTOLIC BLOOD PRESSURE: 126 MMHG | OXYGEN SATURATION: 97 % | TEMPERATURE: 99.6 F | BODY MASS INDEX: 41.2 KG/M2

## 2021-05-03 DIAGNOSIS — Z00.01 ENCOUNTER FOR GENERAL ADULT MEDICAL EXAMINATION WITH ABNORMAL FINDINGS: Primary | ICD-10-CM

## 2021-05-03 DIAGNOSIS — F32.0 MAJOR DEPRESSIVE DISORDER, SINGLE EPISODE, MILD (HCC): ICD-10-CM

## 2021-05-03 DIAGNOSIS — E78.5 DYSLIPIDEMIA: ICD-10-CM

## 2021-05-03 DIAGNOSIS — R53.83 OTHER FATIGUE: ICD-10-CM

## 2021-05-03 PROCEDURE — 99395 PREV VISIT EST AGE 18-39: CPT | Performed by: FAMILY MEDICINE

## 2021-05-03 RX ORDER — SERTRALINE HYDROCHLORIDE 100 MG/1
150 TABLET, FILM COATED ORAL
Qty: 135 TABLET | Refills: 3 | Status: SHIPPED | OUTPATIENT
Start: 2021-05-03

## 2021-05-03 RX ORDER — CYCLOSPORINE 0.5 MG/ML
1 EMULSION OPHTHALMIC 2 TIMES DAILY
COMMUNITY
Start: 2021-03-08

## 2021-05-03 NOTE — ASSESSMENT & PLAN NOTE
Patient presented to the office today for her annual physical gained 14 lb since her last visit  I had a lengthy discussion with patient regarding diet and exercise  I highly recommend she begin an exercise program   I think this will help her physically as well as mentally  Her blood pressure was noted to be elevated today at 140/90  I am going to recheck that  I evaluated her records  Blood pressure was normal when I saw her last August   It was normal when she saw Dr Jefferson Costa a few months back  For now, I am simply going to observe her blood pressure  We discussed about possibility of sleep apnea  This time, I am not going to send the patient for a sleep study  However, I will consider this if she is not improving  I increased her sertraline to 150 mg at bedtime  She will continue bupropion as well  I placed a referral for her to see Dr Elkin Rivera  Hopefully, psychiatric care will be covered under her current insurance  I reviewed labs done for her rheumatologist   I ordered a TSH and lipid panel to be done prior to her next visit with me  I will see her back again in 6 weeks  I told the patient if she starts having suicidal ideations, she needs to contact me or go to the emergency department immediately

## 2021-05-03 NOTE — PATIENT INSTRUCTIONS
Sjogren Syndrome   AMBULATORY CARE:   Sjogren syndrome  is a disease that causes your immune system to attack healthy cells in your body  Tear and saliva glands are the most common areas to be attacked  Sjogren syndrome may happen alone  This is called primary Sjogren syndrome  Secondary Sjogren syndrome means you also have an autoimmune disease, such as lupus or arthritis  Common signs and symptoms of Sjogren syndrome:   · Dry eyes, or eyes that burn or itch    · Blurry vision, or sensitivity to light    · A dry mouth, or a feeling of cotton in your mouth    · Trouble swallowing, speaking, or tasting, or swollen glands in your throat    · Cavities in your teeth or mouth infections    · Pain in your muscles or joints, numb or tingling feeling in your arms or legs    · Dry skin or a rash on your arms or legs    · Dry cough, feeling tired every day, or a low fever    · In women, vaginal dryness    Contact your healthcare provider if:   · You have new or worsening symptoms  · Your symptoms do not get better even with treatment  · You have questions or concerns about your condition or care  For dry eye problems:   · Blink often  You keep your eyes moist each time you blink  Remember to blink often, especially when you are working on a computer  · Protect your eyes  Cover your eyes when you are in cold or windy weather  You can also use a humidifier to help keep the air moist  Avoid anything that irritates your eyes, such as smoke  · Use artificial tears as needed  These are drops or gels you can put in your eyes  Artificial tears help your eyes stay moist  You may be give a prescription, or your healthcare provider may tell you to use over-the-counter types  You might need to use artificial tears that do not contain preservatives  These can irritate your eyes  · Use an ointment at night  An ointment that is thicker than tears may also be helpful  They can make your vision blurry for a time   It may help to use them only before you sleep  · Tear duct plugs may help keep tears in your eyes  Your healthcare provider can put the plugs into the ducts  The plugs prevent tears from draining out of your eyes too quickly  · Steroid drops or surgery may be needed  Steroids or surgery may help if your symptoms are severe  These are usually only needed if other treatments do not work  For dry mouth problems:   · Keep your mouth moist   Chew gum or suck on candy to help produce saliva  Choose gum or candy that does not contain sugar  Dry mouth increases your risk for cavities  Sugar in gum or candy can make your risk even higher  Take sips of water during the day  Try to wet your mouth each time you take a drink  Do not take sips often  You can remove mucus inside your mouth if you sip water too often during the day  The mucus coating in your mouth help keep it moist      · Go to the dentist regularly  Your dentist can check for cavities and clean your teeth  Tell your doctor that you have Sjogren syndrome  Go to the dentist twice each year and if you have any pain or other problems in your mouth  Your dentist may prescribe fluoride supplements or a gel you can apply to your teeth at night  You may also be able to have a coating applied to your teeth to protect the enamel  · Brush and floss your teeth  Use a toothpaste that contains fluoride  Brush your teeth, gums, and tongue  Brush after each meal and before you go to bed  Floss after your brush  · Apply lip balm if your lips are dry or cracked  Ask your healthcare provider what kind of lip balm to use  · Rinse your mouth with water each day  Do not use any rinse or mouthwash that contains alcohol  Alcohol can dry your mouth  Your healthcare provider may prescribe a rinse or gel to use if you have pain or inflammation in your mouth  What else you can do to manage Sjogren syndrome:   · Be careful with your voice    You may be hoarse if your vocal cords are swollen or get irritated from a dry throat or coughing  Do not clear your throat before you talk  Drink some water or use gum or candy to prevent hoarseness  You can also make an "h" sound to clear your throat gently  · Ask about liquids  Depending on your symptoms and other diseases, your healthcare provider may tell you to increase or decrease the amount of liquid you drink  Liquids prevent dehydration, but too much liquid can increase urination  Increased urination can lead to dryness  Limit or do not drink alcohol as directed  Alcohol can increase dryness  · Exercise as directed  Exercise can increase your energy  Exercise can also help keep your joints limber if you have an autoimmune disease such as arthritis  Your healthcare provider can help you create an exercise plan  Ask how often to exercise and which exercises are best for you  · Eat a variety of healthy foods  Healthy foods include fruits, vegetables, low-fat dairy products, lean meats, fish, whole-grain breads, and cooked beans  Limit foods that are high in sodium (salt)  Salt can lead to dehydration and increase dryness  Follow up with your healthcare provider as directed:  Write down your questions so you remember to ask them during your visits  © Copyright [address] [place] [address] Drive Information is for End User's use only and may not be sold, redistributed or otherwise used for commercial purposes  All illustrations and images included in CareNotes® are the copyrighted property of A D A M , Inc  or [facility] Christiana Rashid   The above information is an  only  It is not intended as medical advice for individual conditions or treatments  Talk to your doctor, nurse or pharmacist before following any medical regimen to see if it is safe and effective for you

## 2021-05-03 NOTE — PROGRESS NOTES
Assessment/Plan:    Encounter for general adult medical examination with abnormal findings   Patient presented to the office today for her annual physical gained 14 lb since her last visit  I had a lengthy discussion with patient regarding diet and exercise  I highly recommend she begin an exercise program   I think this will help her physically as well as mentally  Her blood pressure was noted to be elevated today at 140/90  I am going to recheck that  I evaluated her records  Blood pressure was normal when I saw her last August   It was normal when she saw Dr Adrian Awan a few months back  For now, I am simply going to observe her blood pressure  We discussed about possibility of sleep apnea  This time, I am not going to send the patient for a sleep study  However, I will consider this if she is not improving  I increased her sertraline to 150 mg at bedtime  She will continue bupropion as well  I placed a referral for her to see Dr Abida Valencia  Hopefully, psychiatric care will be covered under her current insurance  I reviewed labs done for her rheumatologist   I ordered a TSH and lipid panel to be done prior to her next visit with me  I will see her back again in 6 weeks  I told the patient if she starts having suicidal ideations, she needs to contact me or go to the emergency department immediately  Diagnoses and all orders for this visit:    Encounter for general adult medical examination with abnormal findings    Major depressive disorder, single episode, mild (HCC)  -     TSH, 3rd generation with Free T4 reflex; Future  -     sertraline (ZOLOFT) 100 mg tablet; Take 1 5 tablets (150 mg total) by mouth daily at bedtime  -     Ambulatory referral to Psychiatry; Future    Dyslipidemia  -     Lipid panel; Future    Other fatigue  -     TSH, 3rd generation with Free T4 reflex;  Future    Other orders  -     Restasis 0 05 % ophthalmic emulsion; Administer 1 drop to both eyes 2 (two) times a day BMI Counseling: Body mass index is 41 11 kg/m²  The BMI is above normal  Nutrition recommendations include decreasing portion sizes, encouraging healthy choices of fruits and vegetables, decreasing fast food intake, limiting drinks that contain sugar and moderation in carbohydrate intake  Exercise recommendations include exercising 3-5 times per week  Subjective:      Patient ID: Anaya Winters is a 37 y o  female  This is a 65-year-old white female who presents to the office today for her annual physical   She reports that 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  I note that her last prescription for sertraline was  In early March of 2020  She was given a 90 day supply with 3 refills  If she was taking the medication regularly, she should have been out of it by now  She admits that she has been missing doses  She tells me she got very depressed month ago and did not take care of herself  She tells me she stop taking all of her medications  She was feeling very down and depressed  She tells me she is feeling better now  She still gets down but not as bad as before  She tells me she was experiencing suicidal ideations  She no longer is experiencing these  She is back on the medication  Of note, the patient had seen Psychiatry in the past   Her insurance had changed and apparently, it was no longer a covered service  The following portions of the patient's history were reviewed and updated as appropriate: allergies, current medications, past family history, past medical history, past social history, past surgical history and problem list     Review of Systems   Constitutional: Positive for fatigue  Negative for activity change, appetite change and unexpected weight change  HENT:        Reports dry mouth   Eyes:        Reports dryness of the eyes   Respiratory: Negative for cough      Cardiovascular: Negative for chest pain, palpitations and leg swelling  Gastrointestinal: Negative for abdominal distention, abdominal pain, blood in stool and constipation  Genitourinary: Negative for dysuria and frequency  Musculoskeletal: Positive for arthralgias and joint swelling  Psychiatric/Behavioral: Positive for decreased concentration, dysphoric mood and sleep disturbance  Negative for suicidal ideas (  No suicidal ideations at this time but apparently had them a month or more ago when she was feeling very depressed)  Objective:      /90 (BP Location: Left arm, Patient Position: Sitting, Cuff Size: Large)   Pulse 82   Temp 99 6 °F (37 6 °C) (Tympanic)   Ht 5' 7" (1 702 m)   Wt 119 kg (262 lb 8 oz)   SpO2 97%   BMI 41 11 kg/m²          Physical Exam  Vitals signs reviewed  Constitutional:       Comments: Patient is a 79-year-old obese white female who appears her stated age  She was in no apparent distress   HENT:      Head: Normocephalic and atraumatic  Right Ear: Tympanic membrane, ear canal and external ear normal  There is no impacted cerumen  Left Ear: Tympanic membrane, ear canal and external ear normal  There is no impacted cerumen  Mouth/Throat:      Mouth: Mucous membranes are moist       Pharynx: Oropharynx is clear  No oropharyngeal exudate or posterior oropharyngeal erythema  Comments: No crowding of the oropharynx  Eyes:      General: No scleral icterus  Right eye: No discharge  Left eye: No discharge  Conjunctiva/sclera: Conjunctivae normal       Pupils: Pupils are equal, round, and reactive to light  Neck:      Musculoskeletal: Neck supple  Comments: There is no thyromegaly noted   Neck circumference is 15-1/2 inches  Cardiovascular:      Rate and Rhythm: Normal rate and regular rhythm  Heart sounds: Normal heart sounds  No murmur  No gallop  Pulmonary:      Effort: Pulmonary effort is normal  No respiratory distress  Breath sounds: Normal breath sounds   No stridor  No wheezing, rhonchi or rales  Abdominal:      General: Bowel sounds are normal  There is no distension  Palpations: Abdomen is soft  There is no mass  Tenderness: There is no abdominal tenderness  There is no guarding  Comments: Abdomen is obese  There was no hepatosplenomegaly   Musculoskeletal:      Right lower leg: No edema  Left lower leg: No edema  Comments: No joint swelling, erythema, or tenderness noted   Lymphadenopathy:      Cervical: No cervical adenopathy  Psychiatric:         Behavior: Behavior normal          Thought Content: Thought content normal          Judgment: Judgment normal       Comments: Affect is noted to be blunted    Patient often avoids eye contact

## 2021-05-05 ENCOUNTER — APPOINTMENT (OUTPATIENT)
Dept: LAB | Facility: HOSPITAL | Age: 36
End: 2021-05-05
Attending: INTERNAL MEDICINE
Payer: COMMERCIAL

## 2021-05-05 DIAGNOSIS — R53.83 OTHER FATIGUE: ICD-10-CM

## 2021-05-05 DIAGNOSIS — F32.0 MAJOR DEPRESSIVE DISORDER, SINGLE EPISODE, MILD (HCC): ICD-10-CM

## 2021-05-05 DIAGNOSIS — E78.5 DYSLIPIDEMIA: ICD-10-CM

## 2021-05-05 LAB
ALBUMIN SERPL BCP-MCNC: 4.1 G/DL (ref 3.5–5.7)
ALP SERPL-CCNC: 60 U/L (ref 40–150)
ALT SERPL W P-5'-P-CCNC: 15 U/L (ref 7–52)
ANION GAP SERPL CALCULATED.3IONS-SCNC: 8 MMOL/L (ref 4–13)
AST SERPL W P-5'-P-CCNC: 15 U/L (ref 13–39)
BASOPHILS # BLD AUTO: 0.1 THOUSANDS/ΜL (ref 0–0.1)
BASOPHILS NFR BLD AUTO: 1 % (ref 0–2)
BILIRUB SERPL-MCNC: 0.3 MG/DL (ref 0.2–1)
BUN SERPL-MCNC: 13 MG/DL (ref 7–25)
C3 SERPL-MCNC: 143 MG/DL (ref 90–180)
C4 SERPL-MCNC: 20 MG/DL (ref 10–40)
CALCIUM SERPL-MCNC: 9.5 MG/DL (ref 8.6–10.5)
CHLORIDE SERPL-SCNC: 102 MMOL/L (ref 98–107)
CHOLEST SERPL-MCNC: 213 MG/DL (ref 0–200)
CO2 SERPL-SCNC: 28 MMOL/L (ref 21–31)
CREAT SERPL-MCNC: 0.9 MG/DL (ref 0.6–1.2)
CRP SERPL QL: 5.1 MG/L
EOSINOPHIL # BLD AUTO: 0.5 THOUSAND/ΜL (ref 0–0.61)
EOSINOPHIL NFR BLD AUTO: 7 % (ref 0–5)
ERYTHROCYTE [DISTWIDTH] IN BLOOD BY AUTOMATED COUNT: 15.3 % (ref 11.5–14.5)
ERYTHROCYTE [SEDIMENTATION RATE] IN BLOOD: 14 MM/HOUR (ref 0–19)
GFR SERPL CREATININE-BSD FRML MDRD: 84 ML/MIN/1.73SQ M
GLUCOSE P FAST SERPL-MCNC: 85 MG/DL (ref 65–99)
HCT VFR BLD AUTO: 40.1 % (ref 42–47)
HDLC SERPL-MCNC: 61 MG/DL
HGB BLD-MCNC: 13.1 G/DL (ref 12–16)
LDLC SERPL CALC-MCNC: 140 MG/DL (ref 0–100)
LYMPHOCYTES # BLD AUTO: 2.6 THOUSANDS/ΜL (ref 0.6–4.47)
LYMPHOCYTES NFR BLD AUTO: 38 % (ref 21–51)
MCH RBC QN AUTO: 27.1 PG (ref 26–34)
MCHC RBC AUTO-ENTMCNC: 32.8 G/DL (ref 31–37)
MCV RBC AUTO: 83 FL (ref 81–99)
MONOCYTES # BLD AUTO: 0.5 THOUSAND/ΜL (ref 0.17–1.22)
MONOCYTES NFR BLD AUTO: 7 % (ref 2–12)
NEUTROPHILS # BLD AUTO: 3.3 THOUSANDS/ΜL (ref 1.4–6.5)
NEUTS SEG NFR BLD AUTO: 47 % (ref 42–75)
NONHDLC SERPL-MCNC: 152 MG/DL
PLATELET # BLD AUTO: 297 THOUSANDS/UL (ref 149–390)
PMV BLD AUTO: 8.1 FL (ref 8.6–11.7)
POTASSIUM SERPL-SCNC: 4.1 MMOL/L (ref 3.5–5.5)
PROT SERPL-MCNC: 6.8 G/DL (ref 6.4–8.9)
RBC # BLD AUTO: 4.85 MILLION/UL (ref 3.9–5.2)
SODIUM SERPL-SCNC: 138 MMOL/L (ref 134–143)
TRIGL SERPL-MCNC: 62 MG/DL (ref 44–166)
TSH SERPL DL<=0.05 MIU/L-ACNC: 2.78 UIU/ML (ref 0.45–5.33)
WBC # BLD AUTO: 6.9 THOUSAND/UL (ref 4.8–10.8)

## 2021-05-05 PROCEDURE — 85652 RBC SED RATE AUTOMATED: CPT | Performed by: INTERNAL MEDICINE

## 2021-05-05 PROCEDURE — 86160 COMPLEMENT ANTIGEN: CPT | Performed by: INTERNAL MEDICINE

## 2021-05-05 PROCEDURE — 80061 LIPID PANEL: CPT

## 2021-05-05 PROCEDURE — 36415 COLL VENOUS BLD VENIPUNCTURE: CPT | Performed by: INTERNAL MEDICINE

## 2021-05-05 PROCEDURE — 84443 ASSAY THYROID STIM HORMONE: CPT

## 2021-05-05 PROCEDURE — 84165 PROTEIN E-PHORESIS SERUM: CPT | Performed by: INTERNAL MEDICINE

## 2021-05-05 PROCEDURE — 86140 C-REACTIVE PROTEIN: CPT | Performed by: INTERNAL MEDICINE

## 2021-05-05 PROCEDURE — 85025 COMPLETE CBC W/AUTO DIFF WBC: CPT | Performed by: INTERNAL MEDICINE

## 2021-05-05 PROCEDURE — 84165 PROTEIN E-PHORESIS SERUM: CPT | Performed by: PATHOLOGY

## 2021-05-05 PROCEDURE — 80053 COMPREHEN METABOLIC PANEL: CPT | Performed by: INTERNAL MEDICINE

## 2021-05-06 DIAGNOSIS — M35.01 SJOGREN'S SYNDROME WITH KERATOCONJUNCTIVITIS SICCA (HCC): ICD-10-CM

## 2021-05-06 LAB
ALBUMIN SERPL ELPH-MCNC: 4.03 G/DL (ref 3.5–5)
ALBUMIN SERPL ELPH-MCNC: 60.2 % (ref 52–65)
ALPHA1 GLOB SERPL ELPH-MCNC: 0.34 G/DL (ref 0.1–0.4)
ALPHA1 GLOB SERPL ELPH-MCNC: 5 % (ref 2.5–5)
ALPHA2 GLOB SERPL ELPH-MCNC: 0.86 G/DL (ref 0.4–1.2)
ALPHA2 GLOB SERPL ELPH-MCNC: 12.8 % (ref 7–13)
BETA GLOB ABNORMAL SERPL ELPH-MCNC: 0.52 G/DL (ref 0.4–0.8)
BETA1 GLOB SERPL ELPH-MCNC: 7.7 % (ref 5–13)
BETA2 GLOB SERPL ELPH-MCNC: 4.1 % (ref 2–8)
BETA2+GAMMA GLOB SERPL ELPH-MCNC: 0.27 G/DL (ref 0.2–0.5)
GAMMA GLOB ABNORMAL SERPL ELPH-MCNC: 0.68 G/DL (ref 0.5–1.6)
GAMMA GLOB SERPL ELPH-MCNC: 10.2 % (ref 12–22)
IGG/ALB SER: 1.51 {RATIO} (ref 1.1–1.8)
PROT PATTERN SERPL ELPH-IMP: ABNORMAL
PROT SERPL-MCNC: 6.7 G/DL (ref 6.4–8.2)

## 2021-05-06 RX ORDER — HYDROXYCHLOROQUINE SULFATE 200 MG/1
TABLET, FILM COATED ORAL
Qty: 180 TABLET | Refills: 1 | Status: SHIPPED | OUTPATIENT
Start: 2021-05-06 | End: 2022-06-12 | Stop reason: SDUPTHER

## 2021-06-14 ENCOUNTER — OFFICE VISIT (OUTPATIENT)
Dept: FAMILY MEDICINE CLINIC | Facility: CLINIC | Age: 36
End: 2021-06-14
Payer: COMMERCIAL

## 2021-06-14 VITALS
TEMPERATURE: 97.9 F | HEIGHT: 67 IN | OXYGEN SATURATION: 97 % | DIASTOLIC BLOOD PRESSURE: 78 MMHG | BODY MASS INDEX: 40.3 KG/M2 | SYSTOLIC BLOOD PRESSURE: 120 MMHG | WEIGHT: 256.8 LBS | HEART RATE: 80 BPM

## 2021-06-14 DIAGNOSIS — M35.01 SJOGREN'S SYNDROME WITH KERATOCONJUNCTIVITIS SICCA (HCC): ICD-10-CM

## 2021-06-14 DIAGNOSIS — F32.0 MAJOR DEPRESSIVE DISORDER, SINGLE EPISODE, MILD (HCC): Primary | ICD-10-CM

## 2021-06-14 DIAGNOSIS — E78.5 DYSLIPIDEMIA: ICD-10-CM

## 2021-06-14 PROCEDURE — 99213 OFFICE O/P EST LOW 20 MIN: CPT | Performed by: FAMILY MEDICINE

## 2021-06-14 PROCEDURE — 3008F BODY MASS INDEX DOCD: CPT | Performed by: FAMILY MEDICINE

## 2021-06-14 PROCEDURE — 1036F TOBACCO NON-USER: CPT | Performed by: FAMILY MEDICINE

## 2021-06-14 RX ORDER — TRAZODONE HYDROCHLORIDE 50 MG/1
50-100 TABLET ORAL
COMMUNITY
Start: 2021-06-01 | End: 2021-12-14 | Stop reason: ALTCHOICE

## 2021-06-14 RX ORDER — BUPROPION HYDROCHLORIDE 150 MG/1
150 TABLET ORAL EVERY MORNING
COMMUNITY
Start: 2021-06-01

## 2021-06-14 NOTE — ASSESSMENT & PLAN NOTE
I reviewed the patient's lipid panel  Total cholesterol is 213  HDL cholesterol is 59  LDL cholesterol is 140  Triglycerides were 62  I am recommending that the patient lose weight for treatment  She lost 6 lb since her last visit  I encouraged her to continue to lose weight  I scheduled the patient a follow-up visit to see me in 6 months  I challenged her to lose 24 lb in that period of time  I told her that is only a lb of weak  She certainly can do this  I am not recommending medication for her at this time  I also recheck her blood pressure myself today and found to be 120/82  Blood pressure was normal again  We will continue to follow this

## 2021-06-14 NOTE — PROGRESS NOTES
Assessment/Plan:    Major depressive disorder, single episode, mild ([facility] )    Patient's depression is much improved  She still has some mild symptoms  She is now under the care of Dr Alaina Camacho, so I will turn her care over to him  She will continue her current regiment  If any changes are needed, they will be made by Dr Alaina Camacho  Sjogren's syndrome with keratoconjunctivitis sicca ([facility] )    The patient had recent episode of arthralgias and myalgias  Her exam is normal   I think this is a flare up of her Sjogren's syndrome  She will continue follow-up with Dr Lilia Casillas  I told her in the future, if she has any bear flare ups that do not resolve, I short burst of steroids may help  At this time, no treatment is required  No further workup is indicated  Dyslipidemia    I reviewed the patient's lipid panel  Total cholesterol is 213  HDL cholesterol is 59  LDL cholesterol is 140  Triglycerides were 62  I am recommending that the patient lose weight for treatment  She lost 6 lb since her last visit  I encouraged her to continue to lose weight  I scheduled the patient a follow-up visit to see me in 6 months  I challenged her to lose 24 lb in that period of time  I told her that is only a lb of weak  She certainly can do this  I am not recommending medication for her at this time  I also recheck her blood pressure myself today and found to be 120/82  Blood pressure was normal again  We will continue to follow this  Diagnoses and all orders for this visit:    Major depressive disorder, single episode, mild ([facility] )    Sjogren's syndrome with keratoconjunctivitis sicca ([facility] )    Dyslipidemia    Other orders  -     buPROPion (WELLBUTRIN XL) 150 mg 24 hr tablet; Take 150 mg by mouth every morning  -     traZODone (DESYREL) 50 mg tablet; Take  mg by mouth daily at bedtime          Subjective:      Patient ID: Betina Marrufo is a 37 y o  female        Patient presents to the office today for her 6 week follow-up visit  The patient tells me that she is having symptoms of depression on and off but it is not as bad  Since I last saw her, she did have a consultation with Psychiatry  She was started on trazodone for sleep and her bupropion dose was increased  She tells me she is sleeping better than before  She has been watching her diet was able to lose 6 lb since her last visit  The following portions of the patient's history were reviewed and updated as appropriate: allergies, current medications, past family history, past medical history, past social history, past surgical history and problem list     Review of Systems   Constitutional: Negative for activity change, appetite change and unexpected weight change  HENT:         Reports chronic dryness of the mouth   Musculoskeletal: Positive for arthralgias and myalgias (  Reports an episode of myalgias and arthralgias which lasted about 24 hours and resolved)  Psychiatric/Behavioral: Positive for dysphoric mood  Negative for suicidal ideas  Objective:      /78 (BP Location: Left arm, Patient Position: Sitting, Cuff Size: Large)   Pulse 80   Temp 97 9 °F (36 6 °C) (Temporal)   Ht 5' 7" (1 702 m)   Wt 116 kg (256 lb 12 8 oz)   SpO2 97%   BMI 40 22 kg/m²          Physical Exam  Vitals reviewed  Constitutional:       Comments: This is a 77-year-old white female who appears her stated age  She was in no apparent distress   HENT:      Head: Normocephalic and atraumatic  Right Ear: Tympanic membrane, ear canal and external ear normal  There is no impacted cerumen  Left Ear: Tympanic membrane, ear canal and external ear normal  There is no impacted cerumen  Mouth/Throat:      Mouth: Mucous membranes are moist       Pharynx: Oropharynx is clear  No oropharyngeal exudate  Eyes:      General: No scleral icterus  Right eye: No discharge  Left eye: No discharge        Conjunctiva/sclera: Conjunctivae normal  Pupils: Pupils are equal, round, and reactive to light  Neck:      Comments: There was no thyromegaly noted  Cardiovascular:      Rate and Rhythm: Normal rate and regular rhythm  Heart sounds: Normal heart sounds  No murmur heard  No friction rub  No gallop  Pulmonary:      Effort: Pulmonary effort is normal  No respiratory distress  Breath sounds: Normal breath sounds  No stridor  No wheezing, rhonchi or rales  Musculoskeletal:      Cervical back: Neck supple  Comments: There was no joint swelling, tenderness, erythema, or heat to the touch  There is full range of motion of the shoulders in all planes of movement  The empty can sign is negative  There was a negative painful arc  Duran impingement sign is negative  Neer impingement sign is negative  Sulcus test is negative  Lymphadenopathy:      Cervical: No cervical adenopathy  Psychiatric:         Mood and Affect: Mood normal          Behavior: Behavior normal          Thought Content:  Thought content normal          Judgment: Judgment normal

## 2021-06-14 NOTE — ASSESSMENT & PLAN NOTE
Patient's depression is much improved  She still has some mild symptoms  She is now under the care of Dr Jermaine Mora, so I will turn her care over to him  She will continue her current regiment  If any changes are needed, they will be made by Dr Jermaine Mora

## 2021-06-14 NOTE — ASSESSMENT & PLAN NOTE
The patient had recent episode of arthralgias and myalgias  Her exam is normal   I think this is a flare up of her Sjogren's syndrome  She will continue follow-up with Dr Mike Montalvo  I told her in the future, if she has any bear flare ups that do not resolve, I short burst of steroids may help  At this time, no treatment is required  No further workup is indicated

## 2021-06-14 NOTE — PATIENT INSTRUCTIONS
Sjogren Syndrome   AMBULATORY CARE:   Sjogren syndrome  is a disease that causes your immune system to attack healthy cells in your body  Tear and saliva glands are the most common areas to be attacked  Sjogren syndrome may happen alone  This is called primary Sjogren syndrome  Secondary Sjogren syndrome means you also have an autoimmune disease, such as lupus or arthritis  Common signs and symptoms of Sjogren syndrome:   · Dry eyes, or eyes that burn or itch    · Blurry vision, or sensitivity to light    · A dry mouth, or a feeling of cotton in your mouth    · Trouble swallowing, speaking, or tasting, or swollen glands in your throat    · Cavities in your teeth or mouth infections    · Pain in your muscles or joints, numb or tingling feeling in your arms or legs    · Dry skin or a rash on your arms or legs    · Dry cough, feeling tired every day, or a low fever    · In women, vaginal dryness    Contact your healthcare provider if:   · You have new or worsening symptoms  · Your symptoms do not get better even with treatment  · You have questions or concerns about your condition or care  For dry eye problems:   · Blink often  You keep your eyes moist each time you blink  Remember to blink often, especially when you are working on a computer  · Protect your eyes  Cover your eyes when you are in cold or windy weather  You can also use a humidifier to help keep the air moist  Avoid anything that irritates your eyes, such as smoke  · Use artificial tears as needed  These are drops or gels you can put in your eyes  Artificial tears help your eyes stay moist  You may be give a prescription, or your healthcare provider may tell you to use over-the-counter types  You might need to use artificial tears that do not contain preservatives  These can irritate your eyes  · Use an ointment at night  An ointment that is thicker than tears may also be helpful  They can make your vision blurry for a time   It may help to use them only before you sleep  · Tear duct plugs may help keep tears in your eyes  Your healthcare provider can put the plugs into the ducts  The plugs prevent tears from draining out of your eyes too quickly  · Steroid drops or surgery may be needed  Steroids or surgery may help if your symptoms are severe  These are usually only needed if other treatments do not work  For dry mouth problems:   · Keep your mouth moist   Chew gum or suck on candy to help produce saliva  Choose gum or candy that does not contain sugar  Dry mouth increases your risk for cavities  Sugar in gum or candy can make your risk even higher  Take sips of water during the day  Try to wet your mouth each time you take a drink  Do not take sips often  You can remove mucus inside your mouth if you sip water too often during the day  The mucus coating in your mouth help keep it moist      · Go to the dentist regularly  Your dentist can check for cavities and clean your teeth  Tell your doctor that you have Sjogren syndrome  Go to the dentist twice each year and if you have any pain or other problems in your mouth  Your dentist may prescribe fluoride supplements or a gel you can apply to your teeth at night  You may also be able to have a coating applied to your teeth to protect the enamel  · Brush and floss your teeth  Use a toothpaste that contains fluoride  Brush your teeth, gums, and tongue  Brush after each meal and before you go to bed  Floss after your brush  · Apply lip balm if your lips are dry or cracked  Ask your healthcare provider what kind of lip balm to use  · Rinse your mouth with water each day  Do not use any rinse or mouthwash that contains alcohol  Alcohol can dry your mouth  Your healthcare provider may prescribe a rinse or gel to use if you have pain or inflammation in your mouth  What else you can do to manage Sjogren syndrome:   · Be careful with your voice    You may be hoarse if your vocal cords are swollen or get irritated from a dry throat or coughing  Do not clear your throat before you talk  Drink some water or use gum or candy to prevent hoarseness  You can also make an "h" sound to clear your throat gently  · Ask about liquids  Depending on your symptoms and other diseases, your healthcare provider may tell you to increase or decrease the amount of liquid you drink  Liquids prevent dehydration, but too much liquid can increase urination  Increased urination can lead to dryness  Limit or do not drink alcohol as directed  Alcohol can increase dryness  · Exercise as directed  Exercise can increase your energy  Exercise can also help keep your joints limber if you have an autoimmune disease such as arthritis  Your healthcare provider can help you create an exercise plan  Ask how often to exercise and which exercises are best for you  · Eat a variety of healthy foods  Healthy foods include fruits, vegetables, low-fat dairy products, lean meats, fish, whole-grain breads, and cooked beans  Limit foods that are high in sodium (salt)  Salt can lead to dehydration and increase dryness  Follow up with your healthcare provider as directed:  Write down your questions so you remember to ask them during your visits  © Copyright [address] [place] [address] Drive Information is for End User's use only and may not be sold, redistributed or otherwise used for commercial purposes  All illustrations and images included in CareNotes® are the copyrighted property of A D A M , Inc  or [facility] Christiana Rashid   The above information is an  only  It is not intended as medical advice for individual conditions or treatments  Talk to your doctor, nurse or pharmacist before following any medical regimen to see if it is safe and effective for you

## 2021-06-25 NOTE — PROGRESS NOTES
The [place] prescription drug monitoring program was queried  There were no red flags  Safe to proceed

## 2021-07-19 ENCOUNTER — TELEPHONE (OUTPATIENT)
Dept: FAMILY MEDICINE CLINIC | Facility: CLINIC | Age: 36
End: 2021-07-19

## 2021-07-19 NOTE — TELEPHONE ENCOUNTER
Patient stated that she was told to call for a steroid  she felt like she needed it   Patient is requesting medication

## 2021-07-21 DIAGNOSIS — M35.01 SJOGREN'S SYNDROME WITH KERATOCONJUNCTIVITIS SICCA (HCC): Primary | ICD-10-CM

## 2021-07-21 RX ORDER — METHYLPREDNISOLONE 4 MG/1
TABLET ORAL
Qty: 21 EACH | Refills: 0 | Status: SHIPPED | OUTPATIENT
Start: 2021-07-21 | End: 2021-12-14 | Stop reason: ALTCHOICE

## 2021-07-23 DIAGNOSIS — K21.9 GASTROESOPHAGEAL REFLUX DISEASE WITHOUT ESOPHAGITIS: ICD-10-CM

## 2021-07-23 RX ORDER — OMEPRAZOLE 40 MG/1
40 CAPSULE, DELAYED RELEASE ORAL DAILY
Qty: 90 CAPSULE | Refills: 3 | Status: SHIPPED | OUTPATIENT
Start: 2021-07-23 | End: 2022-08-09

## 2021-08-20 DIAGNOSIS — M79.7 FIBROMYALGIA: ICD-10-CM

## 2021-08-20 RX ORDER — GABAPENTIN 300 MG/1
CAPSULE ORAL
Qty: 90 CAPSULE | Refills: 5 | Status: SHIPPED | OUTPATIENT
Start: 2021-08-20 | End: 2022-07-21

## 2021-11-05 ENCOUNTER — TELEPHONE (OUTPATIENT)
Dept: FAMILY MEDICINE CLINIC | Facility: CLINIC | Age: 36
End: 2021-11-05

## 2021-11-05 DIAGNOSIS — R42 VERTIGO: Primary | ICD-10-CM

## 2021-11-05 RX ORDER — MECLIZINE HYDROCHLORIDE 25 MG/1
25 TABLET ORAL 3 TIMES DAILY
Qty: 90 TABLET | Refills: 0 | Status: SHIPPED | OUTPATIENT
Start: 2021-11-05

## 2021-12-14 ENCOUNTER — OFFICE VISIT (OUTPATIENT)
Dept: FAMILY MEDICINE CLINIC | Facility: CLINIC | Age: 36
End: 2021-12-14
Payer: COMMERCIAL

## 2021-12-14 VITALS
TEMPERATURE: 99.5 F | OXYGEN SATURATION: 98 % | WEIGHT: 259.5 LBS | DIASTOLIC BLOOD PRESSURE: 80 MMHG | HEIGHT: 67 IN | BODY MASS INDEX: 40.73 KG/M2 | HEART RATE: 80 BPM | SYSTOLIC BLOOD PRESSURE: 130 MMHG

## 2021-12-14 DIAGNOSIS — M35.01 SJOGREN'S SYNDROME WITH KERATOCONJUNCTIVITIS SICCA (HCC): Primary | ICD-10-CM

## 2021-12-14 DIAGNOSIS — F33.9 DEPRESSION, RECURRENT (HCC): ICD-10-CM

## 2021-12-14 DIAGNOSIS — G47.33 OBSTRUCTIVE SLEEP APNEA: ICD-10-CM

## 2021-12-14 DIAGNOSIS — Z23 ENCOUNTER FOR IMMUNIZATION: ICD-10-CM

## 2021-12-14 PROCEDURE — 3008F BODY MASS INDEX DOCD: CPT | Performed by: FAMILY MEDICINE

## 2021-12-14 PROCEDURE — 90471 IMMUNIZATION ADMIN: CPT

## 2021-12-14 PROCEDURE — 1036F TOBACCO NON-USER: CPT | Performed by: FAMILY MEDICINE

## 2021-12-14 PROCEDURE — 99214 OFFICE O/P EST MOD 30 MIN: CPT | Performed by: FAMILY MEDICINE

## 2021-12-14 PROCEDURE — 90682 RIV4 VACC RECOMBINANT DNA IM: CPT

## 2022-01-10 ENCOUNTER — TELEPHONE (OUTPATIENT)
Dept: SLEEP CENTER | Facility: CLINIC | Age: 37
End: 2022-01-10

## 2022-01-10 NOTE — TELEPHONE ENCOUNTER
----- Message from Syd Gabriel DO sent at 1/9/2022 11:15 PM EST -----  approved  ----- Message -----  From: Teofilo Houser  Sent: 12/15/2021   8:11 AM EST  To: [facility] Provider    This sleep study needs approval      If approved please sign and return to clerical pool  If denied please include reasons why  Also provide alternative testing if warranted  Please sign and return to clerical pool

## 2022-01-12 ENCOUNTER — TELEPHONE (OUTPATIENT)
Dept: FAMILY MEDICINE CLINIC | Facility: CLINIC | Age: 37
End: 2022-01-12

## 2022-01-12 ENCOUNTER — TELEPHONE (OUTPATIENT)
Dept: OTHER | Facility: OTHER | Age: 37
End: 2022-01-12

## 2022-01-12 DIAGNOSIS — B34.9 VIRAL INFECTION: Primary | ICD-10-CM

## 2022-01-12 PROCEDURE — U0005 INFEC AGEN DETEC AMPLI PROBE: HCPCS | Performed by: FAMILY MEDICINE

## 2022-01-12 PROCEDURE — U0003 INFECTIOUS AGENT DETECTION BY NUCLEIC ACID (DNA OR RNA); SEVERE ACUTE RESPIRATORY SYNDROME CORONAVIRUS 2 (SARS-COV-2) (CORONAVIRUS DISEASE [COVID-19]), AMPLIFIED PROBE TECHNIQUE, MAKING USE OF HIGH THROUGHPUT TECHNOLOGIES AS DESCRIBED BY CMS-2020-01-R: HCPCS | Performed by: FAMILY MEDICINE

## 2022-01-12 NOTE — TELEPHONE ENCOUNTER
Pt's  stats pt the chills, body aches, headach, cough and temp 100 3  Vaccinated x2  Exposed by a family member 01/09/22  Pt will go to [facility] tent  to be tested

## 2022-01-18 ENCOUNTER — TELEPHONE (OUTPATIENT)
Dept: FAMILY MEDICINE CLINIC | Facility: CLINIC | Age: 37
End: 2022-01-18

## 2022-01-18 NOTE — TELEPHONE ENCOUNTER
Pt stats she is concerned she gets tired easily also stats she has a persistant cough  Asking if she can go back to work?

## 2022-01-19 ENCOUNTER — TELEMEDICINE (OUTPATIENT)
Dept: FAMILY MEDICINE CLINIC | Facility: CLINIC | Age: 37
End: 2022-01-19
Payer: COMMERCIAL

## 2022-01-19 VITALS
HEIGHT: 67 IN | TEMPERATURE: 98.6 F | BODY MASS INDEX: 40.49 KG/M2 | DIASTOLIC BLOOD PRESSURE: 81 MMHG | OXYGEN SATURATION: 98 % | SYSTOLIC BLOOD PRESSURE: 125 MMHG | WEIGHT: 258 LBS

## 2022-01-19 DIAGNOSIS — U07.1 COVID-19 VIRUS INFECTION: Primary | ICD-10-CM

## 2022-01-19 PROCEDURE — 99213 OFFICE O/P EST LOW 20 MIN: CPT | Performed by: FAMILY MEDICINE

## 2022-01-19 RX ORDER — BENZONATATE 100 MG/1
CAPSULE ORAL
Qty: 40 CAPSULE | Refills: 0 | Status: SHIPPED | OUTPATIENT
Start: 2022-01-19 | End: 2022-02-02

## 2022-01-19 NOTE — PROGRESS NOTES
COVID-19 Outpatient Progress Note    Assessment/Plan:    Problem List Items Addressed This Visit        Other    COVID-19 virus infection - Primary     The patient has COVID-19 virus infection  Today is day #10  The patient is fully vaccinated and as such, I feel that her risk for progression of her disease is low  She is not taking any immunosuppressants for her Sjogren's syndrome  I told the patient at this point, she should not be contagious any longer  She should be able to break quarantine  We discussed return to work  I have recommended she wear mask  We will continue to treat her symptoms so that she will feel better  I prescribed Tessalon Perles 100 mg  She may take 1-2 capsules 3 times per day as needed for cough  I advised the patient to drink plenty of fluids  She should call me immediately if she has any shortness of breath or if she should start running a fever  A note to return to work was faxed  Patient will call if there is any chance interim changes  Relevant Medications    benzonatate (TESSALON PERLES) 100 mg capsule         Disposition:     Patient has COVID-19 infection  Based off CDC guidelines, they were recommended to isolate for 5 days from the date of the positive test  If they remain asymptomatic, isolation may be ended followed by 5 days of wearing a mask when around othes to minimize risk of infecting others  If they have a fever, continue to stay home until fever resolves for at least 24 hours  I have spent 15 minutes directly with the patient  Greater than 50% of this time was spent in counseling/coordination of care regarding: prognosis, instructions for management, patient and family education and impressions        Encounter provider Kate Toledo DO    Provider located at [address] [place] [address] [address] [address] [phone]    Recent Visits  Date Type Provider Dept   01/18/22 Telephone Mary Ellen Kathleen DO [facility]   01/12/22 Telephone Mary Ellen Kathleen DO [facility]   Showing recent visits within past 7 days and meeting all other requirements  Today's Visits  Date Type Provider Dept   01/19/22 Telemedicine Mary Ellen Kathleen DO [facility]   Showing today's visits and meeting all other requirements  Future Appointments  No visits were found meeting these conditions  Showing future appointments within next 150 days and meeting all other requirements     This virtual check-in was done via [employer] and patient was informed that this is a secure, HIPAA-compliant platform  She agrees to proceed  Patient agrees to participate in a virtual check in via telephone or video visit instead of presenting to the office to address urgent/immediate medical needs  Patient is aware this is a billable service  After connecting through [facility], the patient was identified by name and date of birth  Ozzy Ang was informed that this was a telemedicine visit and that the exam was being conducted confidentially over secure lines  My office door was closed  No one else was in the room  Ozzy Ang acknowledged consent and understanding of privacy and security of the telemedicine visit  I informed the patient that I have reviewed her record in Epic and presented the opportunity for her to ask any questions regarding the visit today  The patient agreed to participate  Verification of patient location:  Patient is located in the following state in which I hold an active license: [place]    Subjective:   Ozzy Ang is a 31 y o  female who has been screened for COVID-19  Symptom change since last report: improving  Patient's symptoms include fatigue, malaise, cough, chest tightness and abdominal pain  Patient denies fever, chills, congestion, rhinorrhea, sore throat, anosmia, loss of taste, shortness of breath, nausea, vomiting, diarrhea, myalgias and headaches       - Date of symptom onset: 1/9/2022  - Date of positive COVID-19 test: 1/12/2022  Type of test: PCR  COVID-19 vaccination status: Fully vaccinated (primary series)    Charlotte WEST has been staying home and has isolated themselves in her home  She is taking care to not share personal items and is cleaning all surfaces that are touched often, like counters, tabletops, and doorknobs using household cleaning sprays or wipes       Wearing a mask when leaving room?: is not      Lab Results   Component Value Date    SARSCOV2 Positive (A) 01/12/2022    SARSCOV2 Not Detected 12/07/2020     Past Medical History:   Diagnosis Date    Arthritis     osteoarthritis    Benign paroxysmal vertigo, unspecified ear     Bilateral temporomandibular joint disorder, unspecified     Depression     Disease of thyroid gland     autoimmune thyroiditis    Dizziness and giddiness     Dyspnea     Fibromyalgia     Sicca syndrome, unspecified     Sjogren's syndrome (HCC)     Spondylolysis, lumbosacral     Sprain of ligaments of cervical spine     Viral intestinal infection, unspecified      Past Surgical History:   Procedure Laterality Date    DENTAL SURGERY      EXPLORATORY LAPAROTOMY       Current Outpatient Medications   Medication Sig Dispense Refill    buPROPion (WELLBUTRIN XL) 150 mg 24 hr tablet Take 150 mg by mouth every morning      clonazePAM (KlonoPIN) 0 25 MG disintegrating tablet DISSOLVE 1 TABLET IN MOUTH TWICE DAILY AS NEEDED FOR ANXIETY 60 tablet 2    gabapentin (NEURONTIN) 300 mg capsule TAKE 1 CAPSULE BY MOUTH THREE TIMES DAILY 90 capsule 5    hydroxychloroquine (PLAQUENIL) 200 mg tablet TAKE 2 TABLETS BY MOUTH ONCE DAILY WITH  BREAKFAST 180 tablet 1    meclizine (ANTIVERT) 25 mg tablet Take 1 tablet (25 mg total) by mouth 3 (three) times a day 90 tablet 0    omeprazole (PriLOSEC) 40 MG capsule Take 1 capsule (40 mg total) by mouth daily 90 capsule 3    ondansetron (ZOFRAN-ODT) 4 mg disintegrating tablet Take 1 tablet (4 mg total) by mouth every 6 (six) hours as needed for nausea or vomiting 20 tablet 3    Restasis 0 05 % ophthalmic emulsion Administer 1 drop to both eyes 2 (two) times a day      sertraline (ZOLOFT) 100 mg tablet Take 1 5 tablets (150 mg total) by mouth daily at bedtime 135 tablet 3    SRONYX 0 1-20 MG-MCG per tablet Take 1 tablet by mouth daily  3    benzonatate (TESSALON PERLES) 100 mg capsule Take 1-2 by mouth three times a day as needed for cough 40 capsule 0    meloxicam (MOBIC) 7 5 mg tablet Take 1 tablet (7 5 mg total) by mouth 2 (two) times a day as needed (costochondritis) (Patient not taking: Reported on 1/19/2022 ) 50 tablet 2     No current facility-administered medications for this visit  Allergies   Allergen Reactions    Amoxicillin Hives       Review of Systems   Constitutional: Positive for fatigue  Negative for chills and fever  HENT: Negative for congestion, rhinorrhea and sore throat  Respiratory: Positive for cough and chest tightness  Negative for shortness of breath  Gastrointestinal: Positive for abdominal pain  Negative for diarrhea, nausea and vomiting  Musculoskeletal: Negative for myalgias  Neurological: Negative for headaches  Objective:    Vitals:    01/19/22 1517   BP: 125/81   Temp: 98 6 °F (37 °C)   SpO2: 98%   Weight: 117 kg (258 lb)   Height: 5' 7" (1 702 m)       Physical Exam  Vitals reviewed  Constitutional:       Comments: This is a 42-year-old white female who appears her stated age  She is nontoxic in appearance and in no apparent distress   HENT:      Head: Normocephalic and atraumatic  Right Ear: External ear normal       Left Ear: External ear normal       Mouth/Throat:      Mouth: Mucous membranes are moist       Pharynx: Oropharynx is clear  No oropharyngeal exudate or posterior oropharyngeal erythema  Eyes:      General: No scleral icterus  Right eye: No discharge  Left eye: No discharge        Conjunctiva/sclera: Conjunctivae normal    Cardiovascular:      Comments: No cyanosis or clubbing of the digits  Pulmonary:      Comments: Patient was coughing during her video visit  She was not tachypneic  She did not appear to be in any respiratory distress  No audible wheezing was appreciated  Lymphadenopathy:      Cervical: No cervical adenopathy  VIRTUAL VISIT DISCLAIMER    Chaparrita Cedeno verbally agrees to participate in [employer]  Pt is aware that [employer] could be limited without vital signs or the ability to perform a full hands-on physical exam  Charlotte Burciaga understands she or the provider may request at any time to terminate the video visit and request the patient to seek care or treatment in person

## 2022-01-20 NOTE — ASSESSMENT & PLAN NOTE
The patient has COVID-19 virus infection  Today is day #10  The patient is fully vaccinated and as such, I feel that her risk for progression of her disease is low  She is not taking any immunosuppressants for her Sjogren's syndrome  I told the patient at this point, she should not be contagious any longer  She should be able to break quarantine  We discussed return to work  I have recommended she wear mask  We will continue to treat her symptoms so that she will feel better  I prescribed Tessalon Perles 100 mg  She may take 1-2 capsules 3 times per day as needed for cough  I advised the patient to drink plenty of fluids  She should call me immediately if she has any shortness of breath or if she should start running a fever  A note to return to work was faxed  Patient will call if there is any chance interim changes

## 2022-01-26 ENCOUNTER — OFFICE VISIT (OUTPATIENT)
Dept: FAMILY MEDICINE CLINIC | Facility: CLINIC | Age: 37
End: 2022-01-26
Payer: COMMERCIAL

## 2022-01-26 VITALS
HEIGHT: 67 IN | SYSTOLIC BLOOD PRESSURE: 120 MMHG | TEMPERATURE: 99.2 F | BODY MASS INDEX: 41.11 KG/M2 | WEIGHT: 261.9 LBS | DIASTOLIC BLOOD PRESSURE: 80 MMHG | OXYGEN SATURATION: 97 % | HEART RATE: 84 BPM

## 2022-01-26 DIAGNOSIS — L83 ACANTHOSIS NIGRICANS: ICD-10-CM

## 2022-01-26 DIAGNOSIS — M26.609 TMJ DYSFUNCTION: ICD-10-CM

## 2022-01-26 DIAGNOSIS — F40.298 PHONOPHOBIA: Primary | ICD-10-CM

## 2022-01-26 DIAGNOSIS — F33.9 DEPRESSION, RECURRENT (HCC): ICD-10-CM

## 2022-01-26 DIAGNOSIS — M35.01 SJOGREN'S SYNDROME WITH KERATOCONJUNCTIVITIS SICCA (HCC): ICD-10-CM

## 2022-01-26 PROCEDURE — 3008F BODY MASS INDEX DOCD: CPT | Performed by: FAMILY MEDICINE

## 2022-01-26 PROCEDURE — 1036F TOBACCO NON-USER: CPT | Performed by: FAMILY MEDICINE

## 2022-01-26 PROCEDURE — 99214 OFFICE O/P EST MOD 30 MIN: CPT | Performed by: FAMILY MEDICINE

## 2022-01-26 RX ORDER — DICLOFENAC SODIUM 75 MG/1
75 TABLET, DELAYED RELEASE ORAL 2 TIMES DAILY
Qty: 20 TABLET | Refills: 1 | Status: SHIPPED | OUTPATIENT
Start: 2022-01-26 | End: 2022-06-14 | Stop reason: ALTCHOICE

## 2022-01-26 NOTE — PROGRESS NOTES
Assessment/Plan:    Phonophobia  Patient has phonophobia with a normal physical exam   She has no history of migraines  I recommended if her symptoms should persist or worsen, she should contact me and I would like to make her appointment to see ENT for consultation  No treatment is required at this time    TMJ dysfunction  The patient's otalgia is secondary to TMJ  She does have prior history of TMJ also  I suspect she may suffer from bruxism because of her depression  This likely contributes to her TMJ  I am going to recommend a 10 day course of NSAIDs  I advised her to avoid chewing gum  She was started on diclofenac 75 mg b i d  With food  Acanthosis nigricans  I explained to the patient to significance of these findings on the neck  The patient needs to lose weight  I explained this is secondary to insulin resistance  This will increase her risk of developing diabetes as she gets older  She also has a family history of diabetes  Her father is diabetic  Diagnoses and all orders for this visit:    Phonophobia    TMJ dysfunction  -     diclofenac (VOLTAREN) 75 mg EC tablet; Take 1 tablet (75 mg total) by mouth 2 (two) times a day Take with food    Sjogren's syndrome with keratoconjunctivitis sicca (HCC)    Depression, recurrent (HCC)    Acanthosis nigricans        BMI Counseling: Body mass index is 41 02 kg/m²  The BMI is above normal  Nutrition recommendations include reducing portion sizes, decreasing overall calorie intake, consuming healthier snacks and moderation in carbohydrate intake  Subjective:      Patient ID: Shara Current is a 31 y o  female  This is a 44-year-old white female who presents to the office today complaining of issues with her ears  I asked her to be more specific  She reports that she is clearance his pain, mostly behind her ears which will radiate down into her neck  She denies otorrhea  Denies her ears feeling block    She tells me her symptoms began 3 weeks ago, prior to having developed COVID-19 infection  Her other complaint is phonophobia  She tells me her ears are very sensitive to sound  The following portions of the patient's history were reviewed and updated as appropriate: allergies, current medications, past family history, past medical history, past social history, past surgical history and problem list     Review of Systems   HENT: Positive for ear pain  Negative for congestion, ear discharge, hearing loss, sinus pressure and sinus pain  Positive for phonophobia   Eyes: Negative for photophobia  Musculoskeletal: Positive for arthralgias, myalgias and neck pain  Neurological: Negative for headaches  Denies migraines         Objective:      /80 (BP Location: Left arm, Patient Position: Sitting, Cuff Size: Large)   Pulse 84   Temp 99 2 °F (37 3 °C) (Tympanic)   Ht 5' 7" (1 702 m)   Wt 119 kg (261 lb 14 4 oz)   SpO2 97%   BMI 41 02 kg/m²          Physical Exam  Vitals reviewed  Constitutional:       Comments: Patient is a 72-year-old white female who appears her stated age  She is pleasant, cooperative, and in no distress  HENT:      Head: Normocephalic and atraumatic  Right Ear: Tympanic membrane, ear canal and external ear normal  There is no impacted cerumen  Left Ear: Tympanic membrane, ear canal and external ear normal  There is no impacted cerumen  Nose: No congestion or rhinorrhea  Mouth/Throat:      Mouth: Mucous membranes are moist       Pharynx: Oropharynx is clear  No oropharyngeal exudate or posterior oropharyngeal erythema  Comments: There was palpable and audible jaw clicking present bilaterally with tenderness noted to palpation over the temporomandibular joints  Eyes:      General: No scleral icterus  Right eye: No discharge  Left eye: No discharge  Conjunctiva/sclera: Conjunctivae normal       Pupils: Pupils are equal, round, and reactive to light  Neck:      Comments: Trachea is midline  No thyromegaly  Cardiovascular:      Rate and Rhythm: Normal rate and regular rhythm  Heart sounds: Normal heart sounds  No murmur heard  No friction rub  No gallop  Pulmonary:      Effort: Pulmonary effort is normal  No respiratory distress  Breath sounds: Normal breath sounds  No stridor  No wheezing, rhonchi or rales  Abdominal:      General: Bowel sounds are normal  There is no distension  Palpations: Abdomen is soft  There is no mass  Tenderness: There is no abdominal tenderness  There is no guarding  Musculoskeletal:      Comments: There was some tenderness noted to palpation in the bilateral paraspinal cervical musculature  Lymphadenopathy:      Cervical: No cervical adenopathy  Skin:     Comments: Acanthosis nigricans is noted   Psychiatric:         Mood and Affect: Mood normal          Behavior: Behavior normal          Thought Content:  Thought content normal          Judgment: Judgment normal        extremities:  Without cyanosis, clubbing, or edema

## 2022-01-26 NOTE — PATIENT INSTRUCTIONS
Temporomandibular Disorder   WHAT YOU NEED TO KNOW:   Temporomandibular disorder is a condition that causes pain in your jaw  The disorder affects the joint between your temporal bone and your mandible (jawbone)  The muscles and nerves around the joint are also affected  DISCHARGE INSTRUCTIONS:   Medicines:   · Pain medicine: You may be given a prescription medicine to decrease pain  Do not wait until the pain is severe before you take this medicine  · NSAIDs:  These medicines decrease swelling and pain  You can buy NSAIDs without a doctor's order  Ask your healthcare provider which medicine is right for you, and how much to take  Take as directed  NSAIDs can cause stomach bleeding or kidney problems if not taken correctly  · Muscle relaxers  help decrease pain and muscle spasms  · Take your medicine as directed  Contact your healthcare provider if you think your medicine is not helping or if you have side effects  Tell him or her if you are allergic to any medicine  Keep a list of the medicines, vitamins, and herbs you take  Include the amounts, and when and why you take them  Bring the list or the pill bottles to follow-up visits  Carry your medicine list with you in case of an emergency  Follow up with your doctor as directed:  Write down your questions so you remember to ask them during your visits  Manage your symptoms:   · Eat soft foods: Your healthcare provider may suggest that you eat only soft foods for several days  A dietitian may work with you to find foods that are easier to bite, chew, or swallow  Examples are soup, applesauce, cottage cheese, pudding, yogurt, and soft fruits  · Use jaw supporting devices:  Splints may be used to support your jaw or keep it from moving  You may need to wear a mouth guard to keep you from clenching or grinding your teeth while you are sleeping  · Use ice and heat:  Ice helps decrease swelling and pain   Ice may also help prevent tissue damage  Use an ice pack, or put crushed ice in a plastic bag  Cover it with a towel and place it on your jaw for 15 to 20 minutes every hour or as directed  After the first 24 to 48 hours, use heat to decrease pain, swelling, and muscle spasms  Apply heat on the area for 20 to 30 minutes every 2 hours for as many days as directed  Use a heating pad, moist warm compress, or a hot water bottle  · Go to physical therapy:  A physical therapist teaches you exercises to help improve movement and strength, and to decrease pain in your jaw  A speech therapist may also help you with swallowing and speech exercises  Contact your healthcare provider if:   · You have a fever  · Your splint or mouth guard is loose  · You have questions or concerns about your condition or care  Seek care immediately or call 911 if:   · You have nausea, are vomiting, or cannot keep liquids down  · You have pain that does not go away even after you take your pain medicine  · You have problems breathing, talking, drinking, eating, or swallowing  · Your splint or mouth guard gets damaged or broken  © Copyright [employer] 2021 Information is for End User's use only and may not be sold, redistributed or otherwise used for commercial purposes  All illustrations and images included in CareNotes® are the copyrighted property of [employer]  or [employer]  The above information is an  only  It is not intended as medical advice for individual conditions or treatments  Talk to your doctor, nurse or pharmacist before following any medical regimen to see if it is safe and effective for you

## 2022-01-27 NOTE — ASSESSMENT & PLAN NOTE
I explained to the patient to significance of these findings on the neck  The patient needs to lose weight  I explained this is secondary to insulin resistance  This will increase her risk of developing diabetes as she gets older  She also has a family history of diabetes  Her father is diabetic

## 2022-01-27 NOTE — ASSESSMENT & PLAN NOTE
Patient has phonophobia with a normal physical exam   She has no history of migraines  I recommended if her symptoms should persist or worsen, she should contact me and I would like to make her appointment to see ENT for consultation    No treatment is required at this time

## 2022-01-27 NOTE — ASSESSMENT & PLAN NOTE
The patient's otalgia is secondary to TMJ  She does have prior history of TMJ also  I suspect she may suffer from bruxism because of her depression  This likely contributes to her TMJ  I am going to recommend a 10 day course of NSAIDs  I advised her to avoid chewing gum  She was started on diclofenac 75 mg b i d  With food

## 2022-03-31 ENCOUNTER — HOSPITAL ENCOUNTER (OUTPATIENT)
Dept: SLEEP CENTER | Facility: HOSPITAL | Age: 37
Discharge: HOME | End: 2022-03-31
Attending: FAMILY MEDICINE
Payer: COMMERCIAL

## 2022-03-31 DIAGNOSIS — G47.33 OBSTRUCTIVE SLEEP APNEA: ICD-10-CM

## 2022-03-31 PROCEDURE — G0399 HOME SLEEP TEST/TYPE 3 PORTA: HCPCS

## 2022-04-02 NOTE — PROGRESS NOTES
Home Sleep Study Documentation    Pre-Sleep Home Study:    Set-up and instructions performed by: INNA Rueda    Technician performed demonstration for Patient: yes    Return demonstration performed by Patient: yes    Written instructions provided to Patient: yes    Patient signed consent form: yes        Post-Sleep Home Study:    Additional comments by Patient: NONE    Home Sleep Study Failed:no:    Failure reason: N/A    Reported or Detected: N/A    Scored by: INNA Rueda

## 2022-04-04 ENCOUNTER — TELEPHONE (OUTPATIENT)
Dept: SLEEP CENTER | Facility: CLINIC | Age: 37
End: 2022-04-04

## 2022-04-04 NOTE — TELEPHONE ENCOUNTER
Left message for the patient to call back for sleep study results       Moderate RENETTA  Patient needs to schedule consult

## 2022-04-05 NOTE — TELEPHONE ENCOUNTER
Returned the patient's call advised sleep study results   Scheduled first available appointment in [address] at patient's request

## 2022-04-13 ENCOUNTER — OFFICE VISIT (OUTPATIENT)
Dept: FAMILY MEDICINE CLINIC | Facility: CLINIC | Age: 37
End: 2022-04-13
Payer: COMMERCIAL

## 2022-04-13 VITALS
DIASTOLIC BLOOD PRESSURE: 74 MMHG | WEIGHT: 263.4 LBS | SYSTOLIC BLOOD PRESSURE: 122 MMHG | HEART RATE: 70 BPM | OXYGEN SATURATION: 97 % | BODY MASS INDEX: 41.34 KG/M2 | TEMPERATURE: 98.9 F | HEIGHT: 67 IN

## 2022-04-13 DIAGNOSIS — R30.9 PAINFUL URINATION: ICD-10-CM

## 2022-04-13 DIAGNOSIS — F33.9 DEPRESSION, RECURRENT (HCC): ICD-10-CM

## 2022-04-13 DIAGNOSIS — N30.00 ACUTE CYSTITIS WITHOUT HEMATURIA: Primary | ICD-10-CM

## 2022-04-13 DIAGNOSIS — R30.0 BURNING WITH URINATION: ICD-10-CM

## 2022-04-13 DIAGNOSIS — R35.0 FREQUENT URINATION: ICD-10-CM

## 2022-04-13 PROBLEM — N80.9 ENDOMETRIOSIS: Status: ACTIVE | Noted: 2022-04-13

## 2022-04-13 LAB
SL AMB  POCT GLUCOSE, UA: NEGATIVE
SL AMB LEUKOCYTE ESTERASE,UA: NEGATIVE
SL AMB POCT BILIRUBIN,UA: NEGATIVE
SL AMB POCT BLOOD,UA: NEGATIVE
SL AMB POCT CLARITY,UA: CLEAR
SL AMB POCT COLOR,UA: YELLOW
SL AMB POCT KETONES,UA: NEGATIVE
SL AMB POCT NITRITE,UA: NEGATIVE
SL AMB POCT PH,UA: 5
SL AMB POCT SPECIFIC GRAVITY,UA: 1
SL AMB POCT URINE PROTEIN: 15
SL AMB POCT UROBILINOGEN: NEGATIVE

## 2022-04-13 PROCEDURE — 99213 OFFICE O/P EST LOW 20 MIN: CPT | Performed by: FAMILY MEDICINE

## 2022-04-13 PROCEDURE — 3008F BODY MASS INDEX DOCD: CPT | Performed by: FAMILY MEDICINE

## 2022-04-13 PROCEDURE — 81002 URINALYSIS NONAUTO W/O SCOPE: CPT | Performed by: FAMILY MEDICINE

## 2022-04-13 PROCEDURE — 1036F TOBACCO NON-USER: CPT | Performed by: FAMILY MEDICINE

## 2022-04-13 PROCEDURE — 87086 URINE CULTURE/COLONY COUNT: CPT | Performed by: FAMILY MEDICINE

## 2022-04-13 RX ORDER — NITROFURANTOIN 25; 75 MG/1; MG/1
100 CAPSULE ORAL 2 TIMES DAILY
Qty: 10 CAPSULE | Refills: 0 | Status: SHIPPED | OUTPATIENT
Start: 2022-04-13 | End: 2022-04-18

## 2022-04-13 NOTE — ASSESSMENT & PLAN NOTE
Patient has symptoms consistent with a urinary tract infection  Her urine dipstick was unremarkable  I am going to refer her urine out for culture  In the interim, I started the patient on Macrobid 100 mg  She will take 1 b i d  With food for 5 days, pending culture results  The patient has been advised to continue to drink plenty of fluids  Call if she develops fever or any worsening of symptoms

## 2022-04-13 NOTE — PROGRESS NOTES
Assessment/Plan:    Acute cystitis without hematuria  Patient has symptoms consistent with a urinary tract infection  Her urine dipstick was unremarkable  I am going to refer her urine out for culture  In the interim, I started the patient on Macrobid 100 mg  She will take 1 b i d  With food for 5 days, pending culture results  The patient has been advised to continue to drink plenty of fluids  Call if she develops fever or any worsening of symptoms  Diagnoses and all orders for this visit:    Acute cystitis without hematuria  -     nitrofurantoin (MACROBID) 100 mg capsule; Take 1 capsule (100 mg total) by mouth 2 (two) times a day for 5 days  -     Urine culture; Future  -     Urine culture    Burning with urination  -     POCT urine dip    Frequent urination  -     POCT urine dip    Painful urination  -     POCT urine dip    Depression, recurrent (HCC)    Other orders  -     Multiple Vitamins-Minerals (MULTIVITAMIN WOMEN PO); Take by mouth          Subjective:      Patient ID: Sara Villa is a 31 y o  female  This patient is a 73-year-old white female who presents to the office today complaining of a urinary tract infection  She complains of dysuria, frequency, and pain in the pelvic area  Her symptoms began 4-5 days ago  She denies any fever  She denies any flank pain  She denies any foul-smelling urine or hematuria  She tells me she does have history of endometriosis and thinks that perhaps that is why she may have the pelvic area pain  She has been drinking plenty of fluids  The following portions of the patient's history were reviewed and updated as appropriate: allergies, current medications, past family history, past medical history, past social history, past surgical history and problem list     Review of Systems   Constitutional: Positive for chills  Negative for fever  Gastrointestinal: Positive for abdominal pain  Genitourinary: Positive for dysuria and frequency  Negative for flank pain and hematuria  Objective:      /74 (BP Location: Left arm, Patient Position: Sitting, Cuff Size: Large)   Pulse 70   Temp 98 9 °F (37 2 °C) (Tympanic)   Ht 5' 7" (1 702 m)   Wt 119 kg (263 lb 6 4 oz)   SpO2 97%   BMI 41 25 kg/m²          Physical Exam  Vitals reviewed  Constitutional:       Comments: Patient is a 47-year-old white female who appears her stated age  She is nonseptic in appearance and in no apparent distress   HENT:      Head: Normocephalic and atraumatic  Right Ear: Tympanic membrane, ear canal and external ear normal  There is no impacted cerumen  Left Ear: Tympanic membrane, ear canal and external ear normal  There is no impacted cerumen  Mouth/Throat:      Mouth: Mucous membranes are moist       Pharynx: Oropharynx is clear  No oropharyngeal exudate or posterior oropharyngeal erythema  Eyes:      General: No scleral icterus  Right eye: No discharge  Left eye: No discharge  Conjunctiva/sclera: Conjunctivae normal       Pupils: Pupils are equal, round, and reactive to light  Neck:      Comments: No thyromegaly  Cardiovascular:      Rate and Rhythm: Normal rate and regular rhythm  Heart sounds: Normal heart sounds  No murmur heard  No friction rub  No gallop  Pulmonary:      Effort: Pulmonary effort is normal  No respiratory distress  Breath sounds: Normal breath sounds  No stridor  No wheezing, rhonchi or rales  Abdominal:      General: Bowel sounds are normal  There is no distension  Palpations: Abdomen is soft  There is no mass  Tenderness: There is no abdominal tenderness  There is no right CVA tenderness, left CVA tenderness or guarding  Comments: There is no organomegaly   Musculoskeletal:      Cervical back: Neck supple  Lymphadenopathy:      Cervical: No cervical adenopathy     Psychiatric:         Mood and Affect: Mood normal          Behavior: Behavior normal  Thought Content:  Thought content normal          Judgment: Judgment normal

## 2022-04-13 NOTE — PATIENT INSTRUCTIONS
Urinary Tract Infection in Women   AMBULATORY CARE:   A urinary tract infection (UTI)  is caused by bacteria that get inside your urinary tract  Most bacteria that enter your urinary tract come out when you urinate  If the bacteria stay in your urinary tract, you may get an infection  Your urinary tract includes your kidneys, ureters, bladder, and urethra  Urine is made in your kidneys, and it flows from the ureters to the bladder  Urine leaves the bladder through the urethra  A UTI is more common in your lower urinary tract, which includes your bladder and urethra  Common symptoms include the following:   · Urinating more often or waking from sleep to urinate    · Pain or burning when you urinate    · Pain or pressure in your lower abdomen     · Urine that smells bad    · Blood in your urine    · Leaking urine    Seek care immediately if:   · You are urinating very little or not at all  · You have a high fever with shaking chills  · You have side or back pain that gets worse  Call your doctor if:   · You have a fever  · You do not feel better after 2 days of taking antibiotics  · You are vomiting  · You have questions or concerns about your condition or care  Treatment for a UTI  may include antibiotics to treat a bacterial infection  You may also need medicines to decrease pain and burning, or decrease the urge to urinate often  If you have UTIs often (called recurrent UTIs), you may be given antibiotics to take regularly  You will be given directions for when and how to use antibiotics  The goal is to prevent UTIs but not cause antibiotic resistance by using antibiotics too often  Prevent a UTI:   · Empty your bladder often  Urinate and empty your bladder as soon as you feel the need  Do not hold your urine for long periods of time  · Wipe from front to back after you urinate or have a bowel movement    This will help prevent germs from getting into your urinary tract through your urethra  · Drink liquids as directed  Ask how much liquid to drink each day and which liquids are best for you  You may need to drink more liquids than usual to help flush out the bacteria  Do not drink alcohol, caffeine, or citrus juices  These can irritate your bladder and increase your symptoms  Your healthcare provider may recommend cranberry juice to help prevent a UTI  · Urinate after you have sex  This can help flush out bacteria passed during sex  · Do not douche or use feminine deodorants  These can change the chemical balance in your vagina  · Change sanitary pads or tampons often  This will help prevent germs from getting into your urinary tract  · Talk to your healthcare provider about your birth control method  You may need to change your method if it is increasing your risk for UTIs  · Wear cotton underwear and clothes that are loose  Tight pants and nylon underwear can trap moisture and cause bacteria to grow  · Vaginal estrogen may be recommended  This medicine helps prevent UTIs in women who have gone through menopause or are in nikki-menopause  · Do pelvic muscle exercises often  Pelvic muscle exercises may help you start and stop urinating  Strong pelvic muscles may help you empty your bladder easier  Squeeze these muscles tightly for 5 seconds like you are trying to hold back urine  Then relax for 5 seconds  Gradually work up to squeezing for 10 seconds  Do 3 sets of 15 repetitions a day, or as directed  Follow up with your doctor as directed:  Write down your questions so you remember to ask them during your visits  © [employer] 2022 Information is for End User's use only and may not be sold, redistributed or otherwise used for commercial purposes  All illustrations and images included in CareNotes® are the copyrighted property of [employer]  or [employer] Rashid   The above information is an  only   It is not intended as medical advice for individual conditions or treatments  Talk to your doctor, nurse or pharmacist before following any medical regimen to see if it is safe and effective for you

## 2022-04-15 LAB — BACTERIA UR CULT: NORMAL

## 2022-04-25 ENCOUNTER — TELEPHONE (OUTPATIENT)
Dept: FAMILY MEDICINE CLINIC | Facility: CLINIC | Age: 37
End: 2022-04-25

## 2022-04-25 NOTE — TELEPHONE ENCOUNTER
PT SENT A MESSAGE STATING SHE HAS BEEN HAVING A FLARE UP OF HER SJOGREN'S  STATES SHE IS IN SO MUCH PAIN AND HAS SPENT THE LAST COUPLE DAYS IN BED DUE TO THIS  PT WOULD LIKE TO KNOW IF YOU COULD PLEASE SEND SOMETHING IN FOR THE PAIN TO HER PHARMACY

## 2022-06-12 DIAGNOSIS — M35.01 SJOGREN'S SYNDROME WITH KERATOCONJUNCTIVITIS SICCA (HCC): ICD-10-CM

## 2022-06-12 RX ORDER — HYDROXYCHLOROQUINE SULFATE 200 MG/1
TABLET, FILM COATED ORAL
Qty: 180 TABLET | Refills: 0 | Status: SHIPPED | OUTPATIENT
Start: 2022-06-12 | End: 2022-09-10

## 2022-06-14 ENCOUNTER — OFFICE VISIT (OUTPATIENT)
Dept: FAMILY MEDICINE CLINIC | Facility: CLINIC | Age: 37
End: 2022-06-14
Payer: COMMERCIAL

## 2022-06-14 VITALS
SYSTOLIC BLOOD PRESSURE: 122 MMHG | TEMPERATURE: 99 F | WEIGHT: 268.7 LBS | DIASTOLIC BLOOD PRESSURE: 68 MMHG | HEART RATE: 84 BPM | HEIGHT: 67 IN | OXYGEN SATURATION: 97 % | BODY MASS INDEX: 42.17 KG/M2

## 2022-06-14 DIAGNOSIS — F33.9 DEPRESSION, RECURRENT (HCC): ICD-10-CM

## 2022-06-14 DIAGNOSIS — E66.01 MORBID OBESITY WITH BMI OF 40.0-44.9, ADULT (HCC): ICD-10-CM

## 2022-06-14 DIAGNOSIS — M35.01 SJOGREN'S SYNDROME WITH KERATOCONJUNCTIVITIS SICCA (HCC): ICD-10-CM

## 2022-06-14 DIAGNOSIS — E78.5 DYSLIPIDEMIA: ICD-10-CM

## 2022-06-14 DIAGNOSIS — R53.83 OTHER FATIGUE: ICD-10-CM

## 2022-06-14 DIAGNOSIS — K21.9 GASTROESOPHAGEAL REFLUX DISEASE WITHOUT ESOPHAGITIS: ICD-10-CM

## 2022-06-14 DIAGNOSIS — M70.62 GREATER TROCHANTERIC BURSITIS OF LEFT HIP: Primary | ICD-10-CM

## 2022-06-14 PROCEDURE — 3725F SCREEN DEPRESSION PERFORMED: CPT | Performed by: FAMILY MEDICINE

## 2022-06-14 PROCEDURE — 99214 OFFICE O/P EST MOD 30 MIN: CPT | Performed by: FAMILY MEDICINE

## 2022-06-14 PROCEDURE — 3008F BODY MASS INDEX DOCD: CPT | Performed by: FAMILY MEDICINE

## 2022-06-14 PROCEDURE — 1036F TOBACCO NON-USER: CPT | Performed by: FAMILY MEDICINE

## 2022-06-14 RX ORDER — CELECOXIB 100 MG/1
100 CAPSULE ORAL 2 TIMES DAILY
Qty: 60 CAPSULE | Refills: 1 | Status: SHIPPED | OUTPATIENT
Start: 2022-06-14

## 2022-06-14 NOTE — ASSESSMENT & PLAN NOTE
Patient has trochanteric bursitis of the left hip  I recommended she ice the hip  I started her on a course of Celebrex  She will take 100 mg b i d  With food until symptoms improve    She should then take the medication as needed

## 2022-06-14 NOTE — ASSESSMENT & PLAN NOTE
Patient still has breakthrough symptoms of heartburn  I attribute this to her eating habits  She must wait 3 hours from 1 she last eats until she goes to bed  She is eating and then going right to bed  She admits to also overeating an eating the wrong foods  She will continue omeprazole 40 mg daily

## 2022-06-14 NOTE — ASSESSMENT & PLAN NOTE
A sed rate and C reactive protein were ordered    The patient will continue follow-up with Dr Yesi Eng

## 2022-06-14 NOTE — PROGRESS NOTES
Assessment/Plan:    Greater trochanteric bursitis of left hip  Patient has trochanteric bursitis of the left hip  I recommended she ice the hip  I started her on a course of Celebrex  She will take 100 mg b i d  With food until symptoms improve  She should then take the medication as needed    Sjogren's syndrome with keratoconjunctivitis sicca (HCC)  A sed rate and C reactive protein were ordered  The patient will continue follow-up with Dr Platt Monday    Depression, recurrent [facility])  Patient has recurrent depression  She will continue close follow-up with her psychiatrist   Psychiatry recently increase the dose of her sertraline  Gastroesophageal reflux disease without esophagitis  Patient still has breakthrough symptoms of heartburn  I attribute this to her eating habits  She must wait 3 hours from 1 she last eats until she goes to bed  She is eating and then going right to bed  She admits to also overeating an eating the wrong foods  She will continue omeprazole 40 mg daily  Diagnoses and all orders for this visit:    Greater trochanteric bursitis of left hip  -     celecoxib (CeleBREX) 100 mg capsule; Take 1 capsule (100 mg total) by mouth 2 (two) times a day    Dyslipidemia  -     Lipid panel; Future    Other fatigue  -     TSH, 3rd generation with Free T4 reflex; Future  -     CBC and differential; Future  -     Comprehensive metabolic panel; Future    [facility], [facility]    Sjogren's syndrome with keratoconjunctivitis sicca (HCC)  -     Sedimentation rate, automated; Future  -     C-reactive protein; Future    Morbid obesity with BMI of 40 0-44 9, adult ([facility] [address] )    Gastroesophageal reflux disease without esophagitis        Depression Screening Follow-up Plan: Patient's depression screening was positive with a PHQ-2 score of   Their PHQ-9 score was 12  Continue regular follow-up with their psychologist/therapist/psychiatrist who is managing their mental health condition(s)  Subjective:      Patient ID: Sara He is a 31 y o  female  HPI    The following portions of the patient's history were reviewed and updated as appropriate: allergies, current medications, past family history, past medical history, past social history, past surgical history and problem list     Review of Systems   Respiratory: Negative for cough, shortness of breath and wheezing  Cardiovascular: Negative for chest pain, palpitations and leg swelling  Gastrointestinal: Positive for nausea  Reports heartburn   Musculoskeletal: Positive for arthralgias and myalgias  Reports TMJ pain  Reports left hip pain   Psychiatric/Behavioral: Positive for dysphoric mood and sleep disturbance  Negative for self-injury and suicidal ideas  Objective:      /68 (BP Location: Left arm, Patient Position: Sitting, Cuff Size: Large)   Pulse 84   Temp 99 °F (37 2 °C) (Tympanic)   Ht 5' 7" (1 702 m)   Wt 122 kg (268 lb 11 2 oz)   SpO2 97%   BMI 42 08 kg/m²          Physical Exam  Vitals reviewed  Constitutional:       Comments: Patient is a 57-year-old white female who appears her stated age  She is morbidly obese  She is in no apparent distress   HENT:      Head: Normocephalic and atraumatic  Right Ear: Tympanic membrane, ear canal and external ear normal       Left Ear: Tympanic membrane, ear canal and external ear normal       Mouth/Throat:      Mouth: Mucous membranes are moist       Pharynx: Oropharynx is clear  No oropharyngeal exudate or posterior oropharyngeal erythema  Comments: Patient had difficulty opening her mouth wide and was wincing, secondary to TMJ pain  Eyes:      General: No scleral icterus  Right eye: No discharge  Left eye: No discharge  Conjunctiva/sclera: Conjunctivae normal       Pupils: Pupils are equal, round, and reactive to light     Neck:      Comments: No thyromegaly  Cardiovascular:      Rate and Rhythm: Normal rate and regular rhythm  Heart sounds: Normal heart sounds  No murmur heard  No friction rub  No gallop  Pulmonary:      Effort: Pulmonary effort is normal  No respiratory distress  Breath sounds: Normal breath sounds  No stridor  No wheezing, rhonchi or rales  Abdominal:      General: Bowel sounds are normal  There is no distension  Palpations: Abdomen is soft  There is no mass  Tenderness: There is no abdominal tenderness  There is no guarding  Comments: No organomegaly   Musculoskeletal:      Cervical back: Neck supple  Comments: There was tenderness noted to palpation over the left greater trochanter   Lymphadenopathy:      Cervical: No cervical adenopathy  Psychiatric:         Behavior: Behavior normal          Thought Content:  Thought content normal          Judgment: Judgment normal       Comments: Patient has a blunted affect

## 2022-06-14 NOTE — ASSESSMENT & PLAN NOTE
Patient has recurrent depression  She will continue close follow-up with her psychiatrist   Psychiatry recently increase the dose of her sertraline

## 2022-06-17 ENCOUNTER — APPOINTMENT (OUTPATIENT)
Dept: LAB | Facility: CLINIC | Age: 37
End: 2022-06-17
Payer: COMMERCIAL

## 2022-06-17 DIAGNOSIS — E78.5 DYSLIPIDEMIA: ICD-10-CM

## 2022-06-17 DIAGNOSIS — M35.01 SJOGREN'S SYNDROME WITH KERATOCONJUNCTIVITIS SICCA (HCC): ICD-10-CM

## 2022-06-17 DIAGNOSIS — R53.83 OTHER FATIGUE: ICD-10-CM

## 2022-06-17 LAB
ALBUMIN SERPL BCP-MCNC: 3.5 G/DL (ref 3.5–5)
ALP SERPL-CCNC: 70 U/L (ref 46–116)
ALT SERPL W P-5'-P-CCNC: 30 U/L (ref 12–78)
ANION GAP SERPL CALCULATED.3IONS-SCNC: 3 MMOL/L (ref 4–13)
AST SERPL W P-5'-P-CCNC: 21 U/L (ref 5–45)
BASOPHILS # BLD AUTO: 0.03 THOUSANDS/ΜL (ref 0–0.1)
BASOPHILS NFR BLD AUTO: 0 % (ref 0–1)
BILIRUB SERPL-MCNC: 0.33 MG/DL (ref 0.2–1)
BUN SERPL-MCNC: 9 MG/DL (ref 5–25)
CALCIUM SERPL-MCNC: 9.4 MG/DL (ref 8.3–10.1)
CHLORIDE SERPL-SCNC: 109 MMOL/L (ref 100–108)
CHOLEST SERPL-MCNC: 223 MG/DL
CO2 SERPL-SCNC: 28 MMOL/L (ref 21–32)
CREAT SERPL-MCNC: 0.89 MG/DL (ref 0.6–1.3)
CRP SERPL QL: 7.4 MG/L
EOSINOPHIL # BLD AUTO: 0.51 THOUSAND/ΜL (ref 0–0.61)
EOSINOPHIL NFR BLD AUTO: 7 % (ref 0–6)
ERYTHROCYTE [DISTWIDTH] IN BLOOD BY AUTOMATED COUNT: 14.9 % (ref 11.6–15.1)
ERYTHROCYTE [SEDIMENTATION RATE] IN BLOOD: 30 MM/HOUR (ref 0–19)
GFR SERPL CREATININE-BSD FRML MDRD: 84 ML/MIN/1.73SQ M
GLUCOSE P FAST SERPL-MCNC: 85 MG/DL (ref 65–99)
HCT VFR BLD AUTO: 42 % (ref 34.8–46.1)
HDLC SERPL-MCNC: 62 MG/DL
HGB BLD-MCNC: 13.8 G/DL (ref 11.5–15.4)
IMM GRANULOCYTES # BLD AUTO: 0.01 THOUSAND/UL (ref 0–0.2)
IMM GRANULOCYTES NFR BLD AUTO: 0 % (ref 0–2)
LDLC SERPL CALC-MCNC: 142 MG/DL (ref 0–100)
LYMPHOCYTES # BLD AUTO: 2.24 THOUSANDS/ΜL (ref 0.6–4.47)
LYMPHOCYTES NFR BLD AUTO: 32 % (ref 14–44)
MCH RBC QN AUTO: 28.5 PG (ref 26.8–34.3)
MCHC RBC AUTO-ENTMCNC: 32.9 G/DL (ref 31.4–37.4)
MCV RBC AUTO: 87 FL (ref 82–98)
MONOCYTES # BLD AUTO: 0.38 THOUSAND/ΜL (ref 0.17–1.22)
MONOCYTES NFR BLD AUTO: 5 % (ref 4–12)
NEUTROPHILS # BLD AUTO: 3.86 THOUSANDS/ΜL (ref 1.85–7.62)
NEUTS SEG NFR BLD AUTO: 56 % (ref 43–75)
NONHDLC SERPL-MCNC: 161 MG/DL
NRBC BLD AUTO-RTO: 0 /100 WBCS
PLATELET # BLD AUTO: 290 THOUSANDS/UL (ref 149–390)
PMV BLD AUTO: 10.6 FL (ref 8.9–12.7)
POTASSIUM SERPL-SCNC: 4.1 MMOL/L (ref 3.5–5.3)
PROT SERPL-MCNC: 7.1 G/DL (ref 6.4–8.2)
RBC # BLD AUTO: 4.84 MILLION/UL (ref 3.81–5.12)
SODIUM SERPL-SCNC: 140 MMOL/L (ref 136–145)
TRIGL SERPL-MCNC: 96 MG/DL
TSH SERPL DL<=0.05 MIU/L-ACNC: 3.61 UIU/ML (ref 0.45–4.5)
WBC # BLD AUTO: 7.03 THOUSAND/UL (ref 4.31–10.16)

## 2022-06-17 PROCEDURE — 85652 RBC SED RATE AUTOMATED: CPT

## 2022-06-17 PROCEDURE — 84443 ASSAY THYROID STIM HORMONE: CPT

## 2022-06-17 PROCEDURE — 86140 C-REACTIVE PROTEIN: CPT

## 2022-06-17 PROCEDURE — 85025 COMPLETE CBC W/AUTO DIFF WBC: CPT

## 2022-06-17 PROCEDURE — 80053 COMPREHEN METABOLIC PANEL: CPT

## 2022-06-17 PROCEDURE — 80061 LIPID PANEL: CPT

## 2022-06-17 PROCEDURE — 36415 COLL VENOUS BLD VENIPUNCTURE: CPT

## 2022-08-09 DIAGNOSIS — R11.0 NAUSEA: ICD-10-CM

## 2022-08-09 DIAGNOSIS — K21.9 GASTROESOPHAGEAL REFLUX DISEASE WITHOUT ESOPHAGITIS: ICD-10-CM

## 2022-08-09 RX ORDER — ONDANSETRON 4 MG/1
4 TABLET, ORALLY DISINTEGRATING ORAL EVERY 6 HOURS PRN
Qty: 20 TABLET | Refills: 3 | Status: SHIPPED | OUTPATIENT
Start: 2022-08-09

## 2022-08-09 RX ORDER — OMEPRAZOLE 40 MG/1
CAPSULE, DELAYED RELEASE ORAL
Qty: 90 CAPSULE | Refills: 0 | Status: SHIPPED | OUTPATIENT
Start: 2022-08-09

## 2022-08-09 RX ORDER — ONDANSETRON 4 MG/1
TABLET, ORALLY DISINTEGRATING ORAL
Qty: 20 TABLET | Refills: 0 | Status: SHIPPED | OUTPATIENT
Start: 2022-08-09 | End: 2022-08-09 | Stop reason: SDUPTHER

## 2022-09-01 ENCOUNTER — OFFICE VISIT (OUTPATIENT)
Dept: FAMILY MEDICINE CLINIC | Facility: CLINIC | Age: 37
End: 2022-09-01
Payer: COMMERCIAL

## 2022-09-01 VITALS
BODY MASS INDEX: 41.5 KG/M2 | SYSTOLIC BLOOD PRESSURE: 114 MMHG | OXYGEN SATURATION: 96 % | WEIGHT: 264.4 LBS | HEIGHT: 67 IN | TEMPERATURE: 96.5 F | HEART RATE: 78 BPM | DIASTOLIC BLOOD PRESSURE: 80 MMHG

## 2022-09-01 DIAGNOSIS — R07.9 CHEST PAIN, UNSPECIFIED TYPE: Primary | ICD-10-CM

## 2022-09-01 DIAGNOSIS — R00.2 PALPITATIONS: ICD-10-CM

## 2022-09-01 DIAGNOSIS — M35.01 SJOGREN'S SYNDROME WITH KERATOCONJUNCTIVITIS SICCA (HCC): ICD-10-CM

## 2022-09-01 PROCEDURE — 99214 OFFICE O/P EST MOD 30 MIN: CPT | Performed by: FAMILY MEDICINE

## 2022-09-01 PROCEDURE — 93000 ELECTROCARDIOGRAM COMPLETE: CPT | Performed by: FAMILY MEDICINE

## 2022-09-01 NOTE — PROGRESS NOTES
Assessment/Plan:    Chest pain  Patient has chest pain associated with shortness of breath and palpitations  An EKG at rest in the office today showed a normal sinus rhythm and was a normal EKG  I ordered a CBC with diff and a CMP  I see no evidence of pulmonary embolism  Pulse ox was normal   Lower extremities were normal   I did not order a D-dimer  I have recommended a chest x-ray  I am also going to recommend a stress echocardiogram   A follow-up visit has been ordered to go over the results    Palpitations  Again, resting EKG in the office was normal   I ordered a 24 hour Holter monitor  I advised the patient that this will come with a diary which I would like her to do  Since she is having symptoms several times per day, this should not be hard to capture which going on  If I see a normal sinus rhythm, I am going to attribute the symptoms to anxiety  Sjogren's syndrome with keratoconjunctivitis sicca ([facility] )  She believe she may be having a flare of her Sjogren's as well  I ordered a sed rate and C reactive protein       Diagnoses and all orders for this visit:    Chest pain, unspecified type  -     POCT ECG  -     XR chest pa & lateral; Future  -     Echo stress test, exercise; Future  -     CBC and differential; Future  -     Comprehensive metabolic panel; Future    Palpitations  -     Holter monitor; Future    Sjogren's syndrome with keratoconjunctivitis sicca (HCC)  -     Sedimentation rate, automated; Future  -     C-reactive protein; Future          Subjective:      Patient ID: Tony Lynn is a 31 y o  female  This is a 79-year-old white female presents to the office today complaining of tightness in her chest and shortness of breath  The patient tells me that this occurred 2 nights ago  Chest pain did not radiate  Chest pain described as a tightness in her chest   She estimates the episode lasted 15-20 minutes  She tells me when she sat on the couch, she then noted palpitations  Her episode resolved on its own  However, she states she has been experiencing palpitations often on for a while  She tells me these occur a few times per day  She tells me she has been attributing this to her anxiety  The following portions of the patient's history were reviewed and updated as appropriate: allergies, current medications, past family history, past medical history, past social history, past surgical history and problem list     Review of Systems   HENT:        Reports dryness of mouth   Eyes:        Reports dryness of eyes   Respiratory: Positive for shortness of breath  Cardiovascular: Positive for chest pain and palpitations  Gastrointestinal: Negative for nausea  Musculoskeletal: Positive for arthralgias and myalgias  Psychiatric/Behavioral: Positive for sleep disturbance  Negative for dysphoric mood  The patient is nervous/anxious  Objective:      /80   Pulse 78   Temp (!) 96 5 °F (35 8 °C) (Tympanic)   Ht 5' 7" (1 702 m)   Wt 120 kg (264 lb 6 4 oz)   SpO2 96%   BMI 41 41 kg/m²          Physical Exam  Vitals reviewed  Constitutional:       Comments: This is a 30-year-old white female who appears her stated age  She is cooperative and in no apparent distress   HENT:      Head: Normocephalic and atraumatic  Right Ear: Tympanic membrane, ear canal and external ear normal  There is no impacted cerumen  Left Ear: Tympanic membrane, ear canal and external ear normal  There is no impacted cerumen  Mouth/Throat:      Mouth: Mucous membranes are moist       Pharynx: Oropharynx is clear  No oropharyngeal exudate or posterior oropharyngeal erythema  Eyes:      General: No scleral icterus  Right eye: No discharge  Left eye: No discharge  Conjunctiva/sclera: Conjunctivae normal       Pupils: Pupils are equal, round, and reactive to light  Cardiovascular:      Rate and Rhythm: Normal rate and regular rhythm        Pulses: Normal pulses  Heart sounds: Normal heart sounds  No murmur heard  No friction rub  No gallop  Pulmonary:      Effort: Pulmonary effort is normal  No respiratory distress  Breath sounds: Normal breath sounds  No stridor  No wheezing, rhonchi or rales  Chest:      Chest wall: No tenderness  Musculoskeletal:      Cervical back: Neck supple  Lymphadenopathy:      Cervical: No cervical adenopathy  Psychiatric:      Comments: Patient does have a blunted affect       extremities:  Without cyanosis, clubbing, or edema  There was no calf tenderness noted    Leg circumference was normal   Homans sign is negative bilaterally

## 2022-09-01 NOTE — ASSESSMENT & PLAN NOTE
Again, resting EKG in the office was normal   I ordered a 24 hour Holter monitor  I advised the patient that this will come with a diary which I would like her to do  Since she is having symptoms several times per day, this should not be hard to capture which going on  If I see a normal sinus rhythm, I am going to attribute the symptoms to anxiety

## 2022-09-01 NOTE — ASSESSMENT & PLAN NOTE
She believe she may be having a flare of her Sjogren's as well    I ordered a sed rate and C reactive protein

## 2022-09-01 NOTE — ASSESSMENT & PLAN NOTE
Patient has chest pain associated with shortness of breath and palpitations  An EKG at rest in the office today showed a normal sinus rhythm and was a normal EKG  I ordered a CBC with diff and a CMP  I see no evidence of pulmonary embolism  Pulse ox was normal   Lower extremities were normal   I did not order a D-dimer  I have recommended a chest x-ray    I am also going to recommend a stress echocardiogram   A follow-up visit has been ordered to go over the results

## 2022-09-14 ENCOUNTER — APPOINTMENT (OUTPATIENT)
Dept: LAB | Facility: CLINIC | Age: 37
End: 2022-09-14
Payer: COMMERCIAL

## 2022-09-14 DIAGNOSIS — M35.01 SJOGREN'S SYNDROME WITH KERATOCONJUNCTIVITIS SICCA (HCC): ICD-10-CM

## 2022-09-14 DIAGNOSIS — R07.9 CHEST PAIN, UNSPECIFIED TYPE: ICD-10-CM

## 2022-09-14 LAB
ALBUMIN SERPL BCP-MCNC: 3.5 G/DL (ref 3.5–5)
ALP SERPL-CCNC: 76 U/L (ref 46–116)
ALT SERPL W P-5'-P-CCNC: 23 U/L (ref 12–78)
ANION GAP SERPL CALCULATED.3IONS-SCNC: 4 MMOL/L (ref 4–13)
AST SERPL W P-5'-P-CCNC: 13 U/L (ref 5–45)
BASOPHILS # BLD AUTO: 0.04 THOUSANDS/ΜL (ref 0–0.1)
BASOPHILS NFR BLD AUTO: 1 % (ref 0–1)
BILIRUB SERPL-MCNC: 0.39 MG/DL (ref 0.2–1)
BUN SERPL-MCNC: 11 MG/DL (ref 5–25)
CALCIUM SERPL-MCNC: 9.2 MG/DL (ref 8.3–10.1)
CHLORIDE SERPL-SCNC: 106 MMOL/L (ref 96–108)
CO2 SERPL-SCNC: 26 MMOL/L (ref 21–32)
CREAT SERPL-MCNC: 0.83 MG/DL (ref 0.6–1.3)
CRP SERPL QL: 5.9 MG/L
EOSINOPHIL # BLD AUTO: 0.41 THOUSAND/ΜL (ref 0–0.61)
EOSINOPHIL NFR BLD AUTO: 5 % (ref 0–6)
ERYTHROCYTE [DISTWIDTH] IN BLOOD BY AUTOMATED COUNT: 13.3 % (ref 11.6–15.1)
ERYTHROCYTE [SEDIMENTATION RATE] IN BLOOD: 27 MM/HOUR (ref 0–19)
GFR SERPL CREATININE-BSD FRML MDRD: 92 ML/MIN/1.73SQ M
GLUCOSE P FAST SERPL-MCNC: 88 MG/DL (ref 65–99)
HCT VFR BLD AUTO: 43.5 % (ref 34.8–46.1)
HGB BLD-MCNC: 14.8 G/DL (ref 11.5–15.4)
IMM GRANULOCYTES # BLD AUTO: 0.02 THOUSAND/UL (ref 0–0.2)
IMM GRANULOCYTES NFR BLD AUTO: 0 % (ref 0–2)
LYMPHOCYTES # BLD AUTO: 2.71 THOUSANDS/ΜL (ref 0.6–4.47)
LYMPHOCYTES NFR BLD AUTO: 33 % (ref 14–44)
MCH RBC QN AUTO: 29.9 PG (ref 26.8–34.3)
MCHC RBC AUTO-ENTMCNC: 34 G/DL (ref 31.4–37.4)
MCV RBC AUTO: 88 FL (ref 82–98)
MONOCYTES # BLD AUTO: 0.42 THOUSAND/ΜL (ref 0.17–1.22)
MONOCYTES NFR BLD AUTO: 5 % (ref 4–12)
NEUTROPHILS # BLD AUTO: 4.64 THOUSANDS/ΜL (ref 1.85–7.62)
NEUTS SEG NFR BLD AUTO: 56 % (ref 43–75)
NRBC BLD AUTO-RTO: 0 /100 WBCS
PLATELET # BLD AUTO: 282 THOUSANDS/UL (ref 149–390)
PMV BLD AUTO: 10 FL (ref 8.9–12.7)
POTASSIUM SERPL-SCNC: 4 MMOL/L (ref 3.5–5.3)
PROT SERPL-MCNC: 6.9 G/DL (ref 6.4–8.4)
RBC # BLD AUTO: 4.95 MILLION/UL (ref 3.81–5.12)
SODIUM SERPL-SCNC: 136 MMOL/L (ref 135–147)
WBC # BLD AUTO: 8.24 THOUSAND/UL (ref 4.31–10.16)

## 2022-09-14 PROCEDURE — 85652 RBC SED RATE AUTOMATED: CPT

## 2022-09-14 PROCEDURE — 86140 C-REACTIVE PROTEIN: CPT

## 2022-09-14 PROCEDURE — 80053 COMPREHEN METABOLIC PANEL: CPT

## 2022-09-14 PROCEDURE — 36415 COLL VENOUS BLD VENIPUNCTURE: CPT

## 2022-09-14 PROCEDURE — 85025 COMPLETE CBC W/AUTO DIFF WBC: CPT

## 2022-09-27 ENCOUNTER — TELEPHONE (OUTPATIENT)
Dept: OBGYN CLINIC | Facility: CLINIC | Age: 37
End: 2022-09-27

## 2022-09-27 ENCOUNTER — TELEPHONE (OUTPATIENT)
Dept: FAMILY MEDICINE CLINIC | Facility: CLINIC | Age: 37
End: 2022-09-27

## 2022-09-27 NOTE — TELEPHONE ENCOUNTER
Patient was to have appt today but got lost and office advised to reschedule  She was rescheduled for 11/9/22 , she is requesting to see if this can be a virtual visit if possible      Please advise and call her back : [phone]

## 2022-09-27 NOTE — TELEPHONE ENCOUNTER
PT SENT A MESSAGE STATING SHE IS FEELING SORE AND STIFF ALL OVER  SHE MISSED HER APPT WITH HER RHEUMATOLOGIST AND WAS HOPING YOU COULD GIVE HER SOMETHING TO HOLD HER OVER TILL HER APPT WITH HER  SHE STATES THIS IS THE SOREST SHE HAS EVER BEEN  PLEASE ADVISE

## 2022-09-28 ENCOUNTER — TELEPHONE (OUTPATIENT)
Dept: FAMILY MEDICINE CLINIC | Facility: CLINIC | Age: 37
End: 2022-09-28

## 2022-09-29 ENCOUNTER — OFFICE VISIT (OUTPATIENT)
Dept: SLEEP CENTER | Facility: CLINIC | Age: 37
End: 2022-09-29
Payer: COMMERCIAL

## 2022-09-29 VITALS
HEIGHT: 67 IN | HEART RATE: 93 BPM | WEIGHT: 268 LBS | OXYGEN SATURATION: 97 % | BODY MASS INDEX: 42.06 KG/M2 | DIASTOLIC BLOOD PRESSURE: 84 MMHG | SYSTOLIC BLOOD PRESSURE: 106 MMHG | TEMPERATURE: 96.9 F

## 2022-09-29 DIAGNOSIS — G47.33 OBSTRUCTIVE SLEEP APNEA: Primary | ICD-10-CM

## 2022-09-29 DIAGNOSIS — G47.19 EXCESSIVE DAYTIME SLEEPINESS: ICD-10-CM

## 2022-09-29 DIAGNOSIS — E66.01 MORBID OBESITY (HCC): ICD-10-CM

## 2022-09-29 DIAGNOSIS — G47.09 OTHER INSOMNIA: ICD-10-CM

## 2022-09-29 DIAGNOSIS — F32.A ANXIETY AND DEPRESSION: ICD-10-CM

## 2022-09-29 DIAGNOSIS — M54.50 CHRONIC BILATERAL LOW BACK PAIN WITHOUT SCIATICA: ICD-10-CM

## 2022-09-29 DIAGNOSIS — G25.81 RLS (RESTLESS LEGS SYNDROME): ICD-10-CM

## 2022-09-29 DIAGNOSIS — M26.609 TMJ DYSFUNCTION: ICD-10-CM

## 2022-09-29 DIAGNOSIS — F41.9 ANXIETY AND DEPRESSION: ICD-10-CM

## 2022-09-29 DIAGNOSIS — M79.7 FIBROMYALGIA: ICD-10-CM

## 2022-09-29 DIAGNOSIS — M35.00 SJOGREN'S SYNDROME, WITH UNSPECIFIED ORGAN INVOLVEMENT (HCC): ICD-10-CM

## 2022-09-29 DIAGNOSIS — G89.29 CHRONIC BILATERAL LOW BACK PAIN WITHOUT SCIATICA: ICD-10-CM

## 2022-09-29 PROCEDURE — 99204 OFFICE O/P NEW MOD 45 MIN: CPT | Performed by: INTERNAL MEDICINE

## 2022-09-29 RX ORDER — LEVONORGESTREL AND ETHINYL ESTRADIOL 0.1-0.02MG
KIT ORAL
COMMUNITY

## 2022-09-29 NOTE — PROGRESS NOTES
Consultation - Sleep Center   Frederik Krabbe  31 y o  female  :1985  YHQ:[id number]  OMC:[id number]    Physician Requesting Consult: Radha Chaudhary DO             Reason for Consult : At your kind request I saw Frederik Krabbe for initial sleep evaluation today  Home sleep testing was undertaken to evaluate for sleep disordered breathing and patient is here to review results and further options  The study demonstrated :  respiratory event index (BRANDON) of 15 8/hr  The oxygen desaturation index was 17 9 per hour  The lowest SpO2 recorded is 83% and 2 6% of the study was spent with saturations below 90%  The snore index was 27 9%    PFSH, Problem List, Medications & Allergies were reviewed in EMR  [place] WEST  has a past medical history of Arthritis, Benign paroxysmal vertigo, unspecified ear, Bilateral temporomandibular joint disorder, unspecified, Depression, Disease of thyroid gland, Dizziness and giddiness, Dyspnea, Fibromyalgia, Sicca syndrome, unspecified, Sjogren's syndrome ([address] ), Spondylolysis, lumbosacral, Sprain of ligaments of cervical spine, and Viral intestinal infection, unspecified  She has a current medication list which includes the following prescription(s): bupropion, celecoxib, clonazepam, gabapentin, levonorgestrel-ethinyl estradiol, meclizine, multiple vitamins-minerals, omeprazole, ondansetron, prednisone, restasis, sertraline, and hydroxychloroquine  HPI:  Study was undertaken bed partner is complaints of loud snoring and witnessed apneas of several years duration and escalated in the past year  She would also awaken herself with gasping for breath or dreams she was being suffocated  Other Complaints:  Constant fatigue that she attributed to her autoimmune condition  Alfred Po Restless Leg Syndrome: has typical symptoms occurring most nights when trying to sleep;  Parasomnia: reports teeth grinding during sleep;   Sleep Routine (on average):   Typical Bedtime:  10:00 p m   Gets OOB: Between 8:00 a m  and noon TIB:>10 hrs  Sleep latency: upto 60 minutes and attributes to racing thoughts  She is on medication for anxiety and depression  Sleep Interruptions:4-5/nite [not always sure of the cause and   struggles to fall back asleep]  Awakens: needing an alarm  Upon awakening: never feels rested   She estimates getting 4-5 hrs sleep  [place]otte WEST reports Excessive Daytime Sleepiness  takes planned naps for up to an hour  She rated herself at Total score: 13 /24 on the [place] Sleepiness Scale  Habits:  reports that she has never smoked  She has never used smokeless tobacco  ;   E-Cigarette/Vaping    E-Cigarette Use Never User     ;  reports no history of drug use ;  reports current alcohol use  ; Caffeine use:excessive until around 6:00 p m ; Exercise routine: sometimes   Family History:  Suspects father has obstructive sleep apnea  ROS - reviewed and as attached  Significant for weight fluctuates in the range of around 10 lb  She has postnasal drip and unexplained respiratory symptoms  She has random palpitations for which she is due to have further investigation  She reports frequent acid reflux  She has musculoskeletal aches and pains  She reports difficulty with memory, ongoing feelings of anxiety and depression    EXAM:  /84 (BP Location: Left arm, Cuff Size: Large)   Pulse 93   Temp (!) 96 9 °F (36 1 °C) (Temporal)   Ht 5' 7" (1 702 m)   Wt 122 kg (268 lb)   SpO2 97%   BMI 41 97 kg/m²    General: Well groomed female, well appearing, in no apparent distress  Neurological: Alert and orientated;  cooperative; Cranial nerves intact;    Psychiatric: Speech:clear and coherent; appears anxious and has a constricted affect    Skin: warm and dry; Color& Hydration good; no facial rashes or lesions   HEENT:  Craniofacial anatomy: normal Sinuses: non- tender   TMJ: Normal    Eyes: EOM's intact;  conjunctiva/corneas clear   Ears: Externallyappear normal     Nasal Airway: narrow nares Septum:intact; Mucosa: normal; Turbinates: normal; Rhinorrhea: None   Mouth: Lips: normal posture; Dentition: normal   Mucosa:moist  ; Hard Palate:normal    Oropharryx: crowded and AP narrowing Tongue: Mallampati:Class IV, Mobile, Macroglossia and ScallopedSoft Palate:  redundant  Tonsils: absent  Neck:is thick and excess fatty tissue; Neck Circumference: 16 "; Supple; no abnormal masses; Thyroid:normal  Trachea:central     Lymph: No Cervical or Submandibular Lymhadenopathy  Heart: S1,S2 normal; RRR; no gallop; no murmurs   Lungs: Respiratory Effort:normal  Air entry good bilaterally  No wheezes  No rales  Abdomen: Obese, Soft & non-tender    Extremities: No pedal edema  No clubbing or cyanosis  Musculoskeletal:  Motor normal; Gait:normal        IMPRESSION: Primary/Secondary Sleep Diagnoses (to Medical or Psych conditions) & Comorbidities   1  Obstructive sleep apnea  Cpap DME   2  Other insomnia     3  RLS (restless legs syndrome)     4  Excessive daytime sleepiness     5  Anxiety and depression     6  Fibromyalgia     7  TMJ dysfunction     8  Chronic bilateral low back pain without sciatica     9  Morbid obesity ([facility] [address] )     10  Sjogren's syndrome, with unspecified organ involvement ([facility] )          PLAN:   1  I reviewed results of the Sleep studies with the patient  2  With respect to above conditions, I counseled on pathophysiology, diagnosis, treatment options, risks and benefits; inter-relationship and effects on symptoms and comorbidities; risks of no treatment; costs and insurance aspects  3  Patient elected positive airway pressure therapy and care coordinated with the DME provider for set-up  4  Need for compliance with therapy and weight reduction were emphasized  5  Multi component Cognitive behavioral therapy for Insomnia undertaken - Sleep Restriction, Stimulus control, Relaxation techniques and Sleep hygiene were discussed        6  Consideration to be given to an alternate to Zoloft that may underlie restless leg symptoms  In her case, Wellbutrin may also be beneficial for weight reduction  7  Follow-up to be scheduled in 2 months to monitor compliance, progress and to adjust therapy  Thank you for allowing me to participate in the care of this patient  I will keep you apprised of developments  Sincerely,      Authenticated electronically on 30/86/38   Board Certified Specialist     Portions of the record may have been created with voice recognition software  Occasional wrong word or "sound a like" substitutions may have occurred due to the inherent limitations of voice recognition software  There may also be notations and random deletions of words or characters from malfunctioning software  Read the chart carefully and recognize, using context, where substitutions/deletions have occurred

## 2022-09-29 NOTE — PROGRESS NOTES
Review of Systems      Genitourinary need to urinate more than twice a night   Cardiology palpitations/fluttering feeling in the chest and ankle/leg swelling   Gastrointestinal frequent heartburn/acid reflux   Neurology need to move extremities, numbness/tingling of an extremity, forgetfulness, difficulty with memory and balance problems   Constitutional claustrophobia and fatigue   Integumentary rash or dry skin and itching   Psychiatry anxiety, depression, aggressiveness or irritability and mood change   Musculoskeletal joint pain, muscle aches, back pain and legs twitching/jerking   Pulmonary shortness of breath with activity, chest tightness, frequent cough and snoring   ENT throat clearing and ringing in ears   Endocrine excessive thirst and frequent urination   Hematological none

## 2022-09-29 NOTE — PATIENT INSTRUCTIONS
What you can do to improve your sleep: (Sleep Hygiene) Basic rules for a good night's sleep    Create a regular sleep schedule  This will help you form a sleep routine  Keep a record of your sleep patterns, and any sleeping problems you have  Bring the record to follow-up visits with healthcare providers  Avoid prolonged use of light-emitting screens before bedtime or watching TV in bed  Avoid forcing sleep  Do not take naps  Naps could make it hard for you to fall asleep at bedtime  Deal with your worries before bedtime  Keep your bedroom cool, quiet, and dark  Turn on white noise, such as a fan, to help you relax  Do not use your bed for any activity that will keep you awake  Do not read, exercise, eat, or watch TV in your bedroom  Get up if you do not fall asleep within 20 minutes  Move to another room and do something relaxing until you become sleepy  Limit caffeine, alcohol, nicotine and food to earlier in the day  Only drink caffeine in the morning  Do not drink alcohol within 6 hours of bedtime  Do not eat a heavy meal right before you go to bed  Avoid smoking, especially in the evening  Exercise regularly  Daily exercise will help you sleep better  Do not exercise within 4 hours of bedtime  Stimulus control therapy rules  1  Go to bed only when sleepy  2  Do not watch television, read, eat, or worry while in bed  Use bed only for sleep and sex  3  Get out of bed if unable to fall asleep within 20 minutes and go to another room  Return to bed only when sleepy  Repeat this step as many times as necessary throughout the night  4  Set an alarm clock to wake up at a fixed time each morning, including weekends  5  Do not take a nap during the day  Data from: [address] [place] [address], [address] [employer] Nonpharmacologic treatments of insomnia  J Clin Psychiatry 4436; 53:37  Go to AASM website for more information: Sleepeducation  org     Recommended Reading:  Book by margie Toledo No More sleepless nights        What is RENETTA? Obstructive sleep apnea is a common and serious sleep disorder that causes you to stop breathing during sleep  The airway repeatedly becomes blocked, limiting the amount of air that reaches your lungs  When this happens, you may snore loudly or making choking noises as you try to breathe  Your brain and body becomes oxygen deprived and you may wake up  This may happen a few times a night, or in more severe cases, several hundred times a night  Sleep apnea can make you wake up in the morning feeling tired or unrefreshed even though you have had a full night of sleep  During the day, you may feel fatigued, have difficulty concentrating or you may even unintentionally fall asleep  This is because your body is waking up numerous times throughout the night, even though you might not be conscious of each awakening  The lack of oxygen your body receives can have negative long-term consequences for your health  This includes:  High blood pressure  Heart disease  Irregular heart rhythms  Stroke  Pre-diabetes and diabetes  Depression    Testing  An objective evaluation of your sleep may be needed before your board certified sleep physician can make a diagnosis  Options include:   In-lab overnight sleep study  This type of sleep study requires you to stay overnight at a sleep center, in a bed that may resemble a hotel room  You will sleep with sensors hooked up to various parts of your body  These sensors record your brain waves, heartbeat, breathing and movement  An overnight sleep study also provides your doctor with the most complete information about your sleep  Learn more about an overnight sleep study       Home sleep apnea test  Some patients with high risk factors for obstructive sleep apnea and no other medical disorders may be candidates for a home sleep apnea test  The testing equipment differs in that it is less complicated than what is used in an overnight sleep study  As such, does not give all the data an in-lab will and if negative, may not mean you do not have the problem  Treatment for sleep apnea  includes using a continuous positive airway pressure (CPAP) machine to keep your airway open during sleep  A mask is placed over your nose and mouth, or just your nose  The mask is hooked to the CPAP machine that blows a gentle stream of air into the mask when you breathe  This helps keep your airway open so you can breathe more regularly  Extra oxygen may be given to you through the machine  You may be given a mouth device  It looks like a mouth guard or dental retainer and stops your tongue and mouth tissues from blocking your throat while you sleep  Surgery may be needed to remove extra tissues that block your mouth, throat, or nose  Manage sleep apnea:   Do not smoke  Nicotine and other chemicals in cigarettes and cigars can cause lung damage  Ask your healthcare provider for information if you currently smoke and need help to quit  E-cigarettes or smokeless tobacco still contain nicotine  Talk to your healthcare provider before you use these products  Do not drink alcohol or take sedative medicine before you go to sleep  Alcohol and sedatives can relax the muscles and tissues around your throat  This can block the airflow to your lungs  Maintain a healthy weight  Excess tissue around your throat may restrict your breathing  Ask your healthcare provider for information if you need to lose weight  Sleep on your side or use pillows designed to prevent sleep apnea  This prevents your tongue or other tissues from blocking your throat  You can also raise the head of your bed  Driving Safety  Refrain from driving when drowsy  Follow up with your healthcare provider as directed:  Write down your questions so you remember to ask them during your visits  Go to AASM website for more information: Sleepeducation  org     What is RENETTA?    Obstructive sleep apnea is a common and serious sleep disorder that causes you to stop breathing during sleep  The airway repeatedly becomes blocked, limiting the amount of air that reaches your lungs  When this happens, you may snore loudly or making choking noises as you try to breathe  Your brain and body becomes oxygen deprived and you may wake up  This may happen a few times a night, or in more severe cases, several hundred times a night  Sleep apnea can make you wake up in the morning feeling tired or unrefreshed even though you have had a full night of sleep  During the day, you may feel fatigued, have difficulty concentrating or you may even unintentionally fall asleep  This is because your body is waking up numerous times throughout the night, even though you might not be conscious of each awakening  The lack of oxygen your body receives can have negative long-term consequences for your health  This includes:  High blood pressure  Heart disease  Irregular heart rhythms  Stroke  Pre-diabetes and diabetes  Depression    Testing  An objective evaluation of your sleep may be needed before your board certified sleep physician can make a diagnosis  Options include:   In-lab overnight sleep study  This type of sleep study requires you to stay overnight at a sleep center, in a bed that may resemble a hotel room  You will sleep with sensors hooked up to various parts of your body  These sensors record your brain waves, heartbeat, breathing and movement  An overnight sleep study also provides your doctor with the most complete information about your sleep  Learn more about an overnight sleep study  Home sleep apnea test  Some patients with high risk factors for obstructive sleep apnea and no other medical disorders may be candidates for a home sleep apnea test  The testing equipment differs in that it is less complicated than what is used in an overnight sleep study   As such, does not give all the data an in-lab will and if negative, may not mean you do not have the problem  Treatment for sleep apnea  includes using a continuous positive airway pressure (CPAP) machine to keep your airway open during sleep  A mask is placed over your nose and mouth, or just your nose  The mask is hooked to the CPAP machine that blows a gentle stream of air into the mask when you breathe  This helps keep your airway open so you can breathe more regularly  Extra oxygen may be given to you through the machine  You may be given a mouth device  It looks like a mouth guard or dental retainer and stops your tongue and mouth tissues from blocking your throat while you sleep  Surgery may be needed to remove extra tissues that block your mouth, throat, or nose  Manage sleep apnea:   Do not smoke  Nicotine and other chemicals in cigarettes and cigars can cause lung damage  Ask your healthcare provider for information if you currently smoke and need help to quit  E-cigarettes or smokeless tobacco still contain nicotine  Talk to your healthcare provider before you use these products  Do not drink alcohol or take sedative medicine before you go to sleep  Alcohol and sedatives can relax the muscles and tissues around your throat  This can block the airflow to your lungs  Maintain a healthy weight  Excess tissue around your throat may restrict your breathing  Ask your healthcare provider for information if you need to lose weight  Sleep on your side or use pillows designed to prevent sleep apnea  This prevents your tongue or other tissues from blocking your throat  You can also raise the head of your bed  Driving Safety  Refrain from driving when drowsy  Follow up with your healthcare provider as directed:  Write down your questions so you remember to ask them during your visits  Go to AAS website for more information: Sleepeducation  org           Nursing Support:  When: Monday through Friday 7A-5PM except holidays  Where: Our direct line is [phone]  If you are having a true emergency please call 911  In the event that the line is busy or it is after hours please leave a voice message and we will return your call  Please speak clearly, leaving your full name, birth date, best number to reach you and the reason for your call  Medication refills: We will need the name of the medication, the dosage, the ordering provider, whether you get a 30 or 90 day refill, and the pharmacy name and address  Medications will be ordered by the provider only  Nurses cannot call in prescriptions  Please allow 7 days for medication refills  Physician requested updates: If your provider requested that you call with an update after starting medication, please be ready to provide us the medication and dosage, what time you take your medication, the time you attempt to fall asleep, time you fall asleep, when you wake up, and what time you get out of bed  Sleep Study Results: We will contact you with sleep study results and/or next steps after the physician has reviewed your testing

## 2022-09-30 ENCOUNTER — TELEPHONE (OUTPATIENT)
Dept: SLEEP CENTER | Facility: CLINIC | Age: 37
End: 2022-09-30

## 2022-09-30 LAB

## 2022-10-09 LAB

## 2022-10-11 PROBLEM — N30.00 ACUTE CYSTITIS WITHOUT HEMATURIA: Status: RESOLVED | Noted: 2022-04-13 | Resolved: 2022-10-11

## 2022-10-13 ENCOUNTER — TELEPHONE (OUTPATIENT)
Dept: SLEEP CENTER | Facility: CLINIC | Age: 37
End: 2022-10-13

## 2022-10-13 LAB

## 2022-10-13 NOTE — TELEPHONE ENCOUNTER
Dione II, cell modem, Set @ 5-15cm, heated humidifier, standard tubing, F30 small FFM   Set up @ [facility], per script Dr Olivo [place]

## 2022-10-24 ENCOUNTER — HOSPITAL ENCOUNTER (OUTPATIENT)
Dept: NON INVASIVE DIAGNOSTICS | Facility: HOSPITAL | Age: 37
End: 2022-10-24
Attending: FAMILY MEDICINE
Payer: COMMERCIAL

## 2022-10-24 ENCOUNTER — HOSPITAL ENCOUNTER (OUTPATIENT)
Dept: NON INVASIVE DIAGNOSTICS | Facility: HOSPITAL | Age: 37
Discharge: HOME | End: 2022-10-24
Attending: FAMILY MEDICINE
Payer: COMMERCIAL

## 2022-10-24 DIAGNOSIS — R00.2 PALPITATIONS: ICD-10-CM

## 2022-10-24 PROCEDURE — 93226 XTRNL ECG REC<48 HR SCAN A/R: CPT

## 2022-10-24 PROCEDURE — 93225 XTRNL ECG REC<48 HRS REC: CPT

## 2022-10-25 PROCEDURE — 93227 XTRNL ECG REC<48 HR R&I: CPT | Performed by: INTERNAL MEDICINE

## 2022-11-03 ENCOUNTER — OFFICE VISIT (OUTPATIENT)
Dept: FAMILY MEDICINE CLINIC | Facility: CLINIC | Age: 37
End: 2022-11-03

## 2022-11-03 VITALS
SYSTOLIC BLOOD PRESSURE: 130 MMHG | DIASTOLIC BLOOD PRESSURE: 78 MMHG | TEMPERATURE: 99.4 F | WEIGHT: 270.2 LBS | HEIGHT: 67 IN | HEART RATE: 76 BPM | BODY MASS INDEX: 42.41 KG/M2 | OXYGEN SATURATION: 98 %

## 2022-11-03 DIAGNOSIS — R00.2 PALPITATIONS: ICD-10-CM

## 2022-11-03 DIAGNOSIS — R07.9 CHEST PAIN, UNSPECIFIED TYPE: Primary | ICD-10-CM

## 2022-11-03 DIAGNOSIS — Z23 ENCOUNTER FOR IMMUNIZATION: ICD-10-CM

## 2022-11-03 NOTE — PROGRESS NOTES
Name: Adal Carmona      : 1985      MRN: [id number]  Encounter Provider: Freida Camargo DO  Encounter Date: 11/3/2022   Encounter department: [facility]    Assessment & Plan     1  Chest pain, unspecified type  Assessment & Plan:  I reviewed the patient's Holter monitor which was unremarkable  Unfortunately, there was no diarrhea attached  She reports she has been having symptoms daily but no diary was attached to the Holter monitor and no correlation with her symptoms can be made  I suspect her symptoms are anxiety related  Unfortunately, stress test was rejected by her insurance  As such, I am going to place a referral for the patient to see Cardiology  Again, pulse ox was normal and no exam findings consistent with a pulmonary embolism so I do not think we need to work her up for that  I advised the patient that if she has recurrent symptoms which do not resolve after only a few minutes, she needs to get to the emergency department  Orders:  -     Ambulatory Referral to Cardiology; Future    2  Palpitations  Assessment & Plan:  As noted, patient has been experiencing palpitations with normal physical exam   Holter monitor was unremarkable  Orders:  -     Ambulatory Referral to Cardiology; Future    3  Encounter for immunization  -     influenza vaccine, quadrivalent, 0 5 mL, preservative-free, for adult and pediatric patients 6 mos+ (AFLURIA, FLUARIX, FLULAVAL, FLUZONE)         Subjective      This is a 82-year-old white female who presents to the office today for her follow-up visit  The patient is here to go over her test results  She informs me that her insurance company denied her stress echocardiogram and advised her to see a cardiologist to get it approved  The patient reports that she is still getting chest pain and shortness of breath  She tells me her symptoms occur daily    She tells me about a week ago, while she was at work, she just finished taking out the garbage when she suddenly developed palpitations  She felt like she would pass out  She went into a back room and sat down and her symptoms eventually subsided  She estimates that they lasted 20 minutes  She did have her Holter monitor  Review of Systems   Constitutional: Negative for activity change and appetite change  Respiratory: Negative for cough, shortness of breath and wheezing  Denies leg pain  Denies pleuritic chest pain  Cardiovascular: Positive for chest pain (Chest pain not related to exertion) and palpitations  Negative for leg swelling  Musculoskeletal: Positive for arthralgias  Neurological: Negative for syncope  Denies syncope but states she has had near-syncope on a few occasions   Psychiatric/Behavioral: Positive for dysphoric mood  The patient is nervous/anxious          Current Outpatient Medications on File Prior to Visit   Medication Sig   • buPROPion (WELLBUTRIN XL) 150 mg 24 hr tablet Take 150 mg by mouth every morning   • celecoxib (CeleBREX) 100 mg capsule Take 1 capsule (100 mg total) by mouth 2 (two) times a day   • clonazePAM (KlonoPIN) 0 25 MG disintegrating tablet DISSOLVE 1 TABLET IN MOUTH TWICE DAILY AS NEEDED FOR ANXIETY   • gabapentin (NEURONTIN) 300 mg capsule TAKE 1 CAPSULE BY MOUTH THREE TIMES DAILY   • levonorgestrel-ethinyl estradiol (AVIANE,ALESSE,LESSINA) 0 1-20 MG-MCG per tablet    • meclizine (ANTIVERT) 25 mg tablet Take 1 tablet (25 mg total) by mouth 3 (three) times a day   • Multiple Vitamins-Minerals (MULTIVITAMIN WOMEN PO) Take by mouth   • omeprazole (PriLOSEC) 40 MG capsule Take 1 capsule by mouth once daily   • ondansetron (ZOFRAN-ODT) 4 mg disintegrating tablet Take 1 tablet (4 mg total) by mouth every 6 (six) hours as needed for nausea or vomiting   • Restasis 0 05 % ophthalmic emulsion Administer 1 drop to both eyes 2 (two) times a day   • sertraline (ZOLOFT) 100 mg tablet Take 1 5 tablets (150 mg total) by mouth daily at bedtime   • hydroxychloroquine (PLAQUENIL) 200 mg tablet TAKE 2 TABLETS BY MOUTH ONCE DAILY WITH BREAKFAST   • predniSONE 20 mg tablet Take 2 daily x 4 days, 1 daily x 4 days, 1/2 daily x 4 days, then stop (Patient not taking: Reported on 11/3/2022)       Objective     /78   Pulse 76   Temp 99 4 °F (37 4 °C) (Tympanic)   Ht 5' 7" (1 702 m)   Wt 123 kg (270 lb 3 2 oz)   SpO2 98%   BMI 42 32 kg/m²     Physical Exam  Vitals reviewed  Constitutional:       Comments: This patient is a morbidly obese 79-year-old white female who appears her stated age  She was seated on the exam room chair  She was accompanied by her mother  She was in no apparent distress   HENT:      Head: Normocephalic and atraumatic  Right Ear: Tympanic membrane, ear canal and external ear normal  There is no impacted cerumen  Left Ear: Tympanic membrane, ear canal and external ear normal  There is no impacted cerumen  Mouth/Throat:      Mouth: Mucous membranes are moist       Pharynx: Oropharynx is clear  No oropharyngeal exudate or posterior oropharyngeal erythema  Eyes:      General: No scleral icterus  Right eye: No discharge  Left eye: No discharge  Conjunctiva/sclera: Conjunctivae normal       Pupils: Pupils are equal, round, and reactive to light  Neck:      Comments: No thyromegaly is noted  Cardiovascular:      Rate and Rhythm: Normal rate and regular rhythm  Heart sounds: Normal heart sounds  No murmur heard  No friction rub  No gallop  Pulmonary:      Effort: Pulmonary effort is normal  No respiratory distress  Breath sounds: Normal breath sounds  No stridor  No wheezing, rhonchi or rales  Musculoskeletal:      Cervical back: Neck supple  Lymphadenopathy:      Cervical: No cervical adenopathy  Psychiatric:         Behavior: Behavior normal          Thought Content:  Thought content normal          Judgment: Judgment normal       Comments: Patient does have a blunted affect     Extremities:  Without cyanosis, clubbing, or edema  The calves were soft  There was no calf tenderness  Homans sign was negative bilaterally      Jeremy Courser, DO

## 2022-11-03 NOTE — ASSESSMENT & PLAN NOTE
I reviewed the patient's Holter monitor which was unremarkable  Unfortunately, there was no diarrhea attached  She reports she has been having symptoms daily but no diary was attached to the Holter monitor and no correlation with her symptoms can be made  I suspect her symptoms are anxiety related  Unfortunately, stress test was rejected by her insurance  As such, I am going to place a referral for the patient to see Cardiology  Again, pulse ox was normal and no exam findings consistent with a pulmonary embolism so I do not think we need to work her up for that  I advised the patient that if she has recurrent symptoms which do not resolve after only a few minutes, she needs to get to the emergency department

## 2022-11-03 NOTE — ASSESSMENT & PLAN NOTE
As noted, patient has been experiencing palpitations with normal physical exam   Holter monitor was unremarkable

## 2022-11-08 ENCOUNTER — CONSULT (OUTPATIENT)
Dept: CARDIOLOGY CLINIC | Facility: CLINIC | Age: 37
End: 2022-11-08

## 2022-11-08 VITALS
HEIGHT: 67 IN | SYSTOLIC BLOOD PRESSURE: 126 MMHG | HEART RATE: 68 BPM | BODY MASS INDEX: 43 KG/M2 | WEIGHT: 274 LBS | DIASTOLIC BLOOD PRESSURE: 82 MMHG

## 2022-11-08 DIAGNOSIS — G47.33 OBSTRUCTIVE SLEEP APNEA: ICD-10-CM

## 2022-11-08 DIAGNOSIS — R00.2 PALPITATIONS: ICD-10-CM

## 2022-11-08 DIAGNOSIS — Z86.16 HISTORY OF COVID-19: ICD-10-CM

## 2022-11-08 DIAGNOSIS — R07.9 CHEST PAIN, UNSPECIFIED TYPE: Primary | ICD-10-CM

## 2022-11-08 DIAGNOSIS — E66.01 CLASS 3 SEVERE OBESITY DUE TO EXCESS CALORIES WITH BODY MASS INDEX (BMI) OF 40.0 TO 44.9 IN ADULT, UNSPECIFIED WHETHER SERIOUS COMORBIDITY PRESENT (HCC): ICD-10-CM

## 2022-11-08 NOTE — PROGRESS NOTES
Assessment and Plan:   Patient is a 45-year-old female who presents for rheumatology follow-up for history of Sjogren's syndrome  She also has chronic pain from fibromyalgia  For her Sjogren's she has only had a borderline positive SSB antibody of unclear clinical relevance  She has had sicca symptoms over the years along with chronic fatigue and joint pain, however again she does have fibromyalgia and so clinical picture is confounded by symptoms from this condition as well  She remains on hydroxychloroquine and reports her eye exam is up-to-date without issues  She is having some increased pain from her fibromyalgia and so I increased her Celebrex dose to take as needed  She had recent blood work that we reviewed which look stable and she can get blood work again in 1 year before next follow-up  In the future it would be reasonable to try coming off the hydroxychloroquine given unclear clinical benefit  Plan:  Diagnoses and all orders for this visit:    Sjogren's syndrome with keratoconjunctivitis sicca (HCC)  -     Protein electrophoresis, serum  -     CBC and differential  -     C-reactive protein  -     Basic metabolic panel  -     Sedimentation rate, automated  -     celecoxib (CeleBREX) 200 mg capsule; Take 1 capsule (200 mg total) by mouth 2 (two) times a day as needed for mild pain  -     hydroxychloroquine (PLAQUENIL) 200 mg tablet; Take 2 tablets (400 mg total) by mouth daily with breakfast    Long-term use of Plaquenil    Sicca syndrome (HCC)  -     Protein electrophoresis, serum  -     CBC and differential  -     C-reactive protein  -     Basic metabolic panel  -     Sedimentation rate, automated    Polyarthralgia  -     celecoxib (CeleBREX) 200 mg capsule; Take 1 capsule (200 mg total) by mouth 2 (two) times a day as needed for mild pain        Follow-up plan: 1 year        Rheumatic Disease Summary  1   Sjogren's syndrome  -Est with me 8/5/19 for Sjogren’s with prior borderline +SSB, previously dx by Dr Gelacio Freeman 2016 and he started plaquenil which improved joint pain and fatigue; also saw [facility] rheum Dr Amna Granado in 2017  -Labs 9/2018: +SSB 1 4, neg SSA Serologies: Negative  EKATERINA, RF, CCP, Marx, RNP, C3, C4, dsDNA  -Presented on plaquenil and Restasis, stable  -Labs 8/2019: +SSB 1 4, negative EKATERINA, SSA, dsDNA, smith, RNP, RF, CCP, hep panel, normal C3/4, CRP 5 4, ESR 19, Vit D 20  -Visit 3/16/20: stable on plaquenil, no changes   -Visit 1/25/21: stable on plaquenil and restasis eye drops  -Visit 11/9/22: stable on HCQ, consider trial off med in future given unclear benefit; sent in celebrex PRN for chronic joint pain   2  Fibromyalgia  -Gabapentin started 8/2019 with improvement   -XRs lumbar/SI joints 8/2019: OA and DDD, no inflammatory changes   3  Comorbidities: GERD, anxiety, depression, endometriosis, RENETTA      HPI  Juan Hatfield is a 30 y o   female who presents for rheumatology follow-up for Sjogren syndrome   She has been stable on Plaquenil therapy for quite some time  Reports lately her knees and arms are bothering her more  Left knee in particular  Stiff all over in the morning and then loosens up in 10-15 minutes but she does continue to have pain throughout the day that is widespread  No specific joint swelling  The pain she is experiencing feels like her typical fibromyalgia type pain  It is not new or different for her  She continues to have dry eyes and is on Restasis  She is using the Celebrex 100 mg as needed  She denies any photosensitivity, rashes, or symptoms of Raynaud phenomenon  Reports she is up-to-date with her eye exam for Plaquenil monitoring and was normal within the past 1 month        The following portions of the patient's history were reviewed and updated as appropriate: allergies, current medications, past family history, past medical history, past social history, past surgical history and problem list     Review of Systems:   Review of Systems Musculoskeletal: Positive for arthralgias and myalgias  Negative for joint swelling  Skin: Negative for rash         Home Medications:    Current Outpatient Medications:   •  celecoxib (CeleBREX) 200 mg capsule, Take 1 capsule (200 mg total) by mouth 2 (two) times a day as needed for mild pain, Disp: 180 capsule, Rfl: 1  •  hydroxychloroquine (PLAQUENIL) 200 mg tablet, Take 2 tablets (400 mg total) by mouth daily with breakfast, Disp: 180 tablet, Rfl: 1  •  buPROPion (WELLBUTRIN XL) 150 mg 24 hr tablet, Take 150 mg by mouth every morning, Disp: , Rfl:   •  clonazePAM (KlonoPIN) 0 25 MG disintegrating tablet, DISSOLVE 1 TABLET IN MOUTH TWICE DAILY AS NEEDED FOR ANXIETY, Disp: 60 tablet, Rfl: 2  •  gabapentin (NEURONTIN) 300 mg capsule, TAKE 1 CAPSULE BY MOUTH THREE TIMES DAILY, Disp: 90 capsule, Rfl: 2  •  levonorgestrel-ethinyl estradiol (AVIANE,ALESSE,LESSINA) 0 1-20 MG-MCG per tablet, , Disp: , Rfl:   •  meclizine (ANTIVERT) 25 mg tablet, Take 1 tablet (25 mg total) by mouth 3 (three) times a day, Disp: 90 tablet, Rfl: 0  •  Multiple Vitamins-Minerals (MULTIVITAMIN WOMEN PO), Take by mouth, Disp: , Rfl:   •  omeprazole (PriLOSEC) 40 MG capsule, Take 1 capsule by mouth once daily, Disp: 90 capsule, Rfl: 0  •  ondansetron (ZOFRAN-ODT) 4 mg disintegrating tablet, Take 1 tablet (4 mg total) by mouth every 6 (six) hours as needed for nausea or vomiting, Disp: 20 tablet, Rfl: 3  •  Restasis 0 05 % ophthalmic emulsion, Administer 1 drop to both eyes 2 (two) times a day, Disp: , Rfl:   •  sertraline (ZOLOFT) 100 mg tablet, Take 1 5 tablets (150 mg total) by mouth daily at bedtime, Disp: 135 tablet, Rfl: 3      Labs:   Component      Latest Ref Rng & Units 9/14/2022   WBC      4 31 - 10 16 Thousand/uL 8 24   Red Blood Cell Count      3 81 - 5 12 Million/uL 4 95   Hemoglobin      11 5 - 15 4 g/dL 14 8   HCT      34 8 - 46 1 % 43 5   MCV      82 - 98 fL 88   MCH      26 8 - 34 3 pg 29 9   MCHC      31 4 - 37 4 g/dL 34 0 RDW      11 6 - 15 1 % 13 3   MPV      8 9 - 12 7 fL 10 0   Platelet Count      362 - 390 Thousands/uL 282   nRBC      /100 WBCs 0   Neutrophils %      43 - 75 % 56   Immat GRANS %      0 - 2 % 0   Lymphocytes Relative      14 - 44 % 33   Monocytes Relative      4 - 12 % 5   Eosinophils      0 - 6 % 5   Basophils Relative      0 - 1 % 1   Absolute Neutrophils      1 85 - 7 62 Thousands/µL 4 64   Immature Grans Absolute      0 00 - 0 20 Thousand/uL 0 02   Lymphocytes Absolute      0 60 - 4 47 Thousands/µL 2 71   Absolute Monocytes      0 17 - 1 22 Thousand/µL 0 42   Absolute Eosinophils      0 00 - 0 61 Thousand/µL 0 41   Basophils Absolute      0 00 - 0 10 Thousands/µL 0 04   Sodium      135 - 147 mmol/L 136   Potassium      3 5 - 5 3 mmol/L 4 0   Chloride      96 - 108 mmol/L 106   CO2      21 - 32 mmol/L 26   Anion Gap      4 - 13 mmol/L 4   BUN      5 - 25 mg/dL 11   Creatinine      0 60 - 1 30 mg/dL 0 83   GLUCOSE FASTING      65 - 99 mg/dL 88   Calcium      8 3 - 10 1 mg/dL 9 2   AST      5 - 45 U/L 13   ALT      12 - 78 U/L 23   Alkaline Phosphatase      46 - 116 U/L 76   Total Protein      6 4 - 8 4 g/dL 6 9   Albumin      3 5 - 5 0 g/dL 3 5   TOTAL BILIRUBIN      0 20 - 1 00 mg/dL 0 39   eGFR      ml/min/1 73sq m 92   Sed Rate      0 - 19 mm/hour 27 (H)   C-REACTIVE PROTEIN      <3 0 mg/L 5 9 (H)      Component      Latest Ref Rng & Units 5/5/2021   Albumin/Globulin Ratio      1 10 - 1 80 1 51   ALBUMIN ELP      52 0 - 65 0 % 60 2   ALBUMIN CONC ELP      3 50 - 5 00 g/dl 4 03   ALPHA 1      2 5 - 5 0 % 5 0   ALPHA 1 CONC ELP      0 10 - 0 40 g/dL 0 34   ALPHA 2      7 0 - 13 0 % 12 8   ALPHA 2 CONC ELP      0 40 - 1 20 g/dL 0 86   BETA-1      5 0 - 13 0 % 7 7   BETA 1 CONC ELP      0 40 - 0 80 g/dL 0 52   BETA-2      2 0 - 8 0 % 4 1   BETA 2 CONC ELP      0 20 - 0 50 g/dL 0 27   GAMMA GLOBULIN      12 0 - 22 0 % 10 2 (L)   GAMMA CONC ELP      0 50 - 1 60 g/dL 0 68   Total Protein      6 4 - 8 2 g/dL 6 7 SPEP INTERPRETATION       No monoclonal bands          C3 Complement      90 0 - 180 0 mg/dL 143 0   C4, COMPLEMENT      10 0 - 40 0 mg/dL 20 0

## 2022-11-08 NOTE — PROGRESS NOTES
Cardiology Consultation     Rachel Gomez  [id number]  1985   [facility] [address] [address]      1  Chest pain, unspecified type  Ambulatory Referral to Cardiology   2  Palpitations  Ambulatory Referral to Cardiology   3  Obstructive sleep apnea     4  Class 3 severe obesity due to excess calories with body mass index (BMI) of 40 0 to 44 9 in adult, unspecified whether serious comorbidity present ([facility] [address] )     5  History of COVID-19         Discussion/Summary:  1  Chest pain and dyspnea  2  Intermittent palpitations  3  Obstructive sleep apnea now treated with CPAP therapy  4  Obesity  5  Hyperlipidemia  6  History COVID-19    -24 hour Holter monitor 10/24/2022 showing predominantly sinus rhythm average heart rate 71 beats per minute with rare ventricular and supraventricular ectopic activity with no significant symptoms appreciated during monitored time frame    -EKG 09/01/2022 personally reviewed showing sinus rhythm heart rate 66 beats per minute with nonspecific ST abnormalities  -as patient's exercise stress echocardiogram was denied by insurance will attempt ECG treadmill stress testing as well as transthoracic echocardiogram to evaluate overall cardiac structure and function  -counseled patient on dietary lifestyle modifications including following a low-salt, low-fat, heart healthy diet with continued compliance with CPAP therapy and weight loss  -patient will be seen in 1 month or sooner if necessary once testing is completed  -patient counseled if she were to have any warning or alarm type symptoms she is to seek emergency medical care immediately      History of Present Illness:  - patient is a 73-year-old female with recently diagnosed and just began treatment for obstructive sleep apnea with CPAP therapy, intermittent palpitations, obesity, Sjogren syndrome currently on medical therapy, history of COVID-19 in January of 2021, fibromyalgia, anxiety and depression, hyperlipidemia and possible documented history of disease of thyroid gland who presents to the office today after referral from her primary care physician for episodes of chest pain and shortness of breath on exertion along with intermittent palpitations  The patient notes that the symptoms began a couple months ago it does not notice a significant exacerbating factor at that time  She states that happen a couple times a week and seem to last anywhere from a couple minutes to as much is 20 minutes in duration  She notes that some of her symptoms can be with exertion but she has had them at rest as well  She denies any loss of consciousness with episodes but does have significant family history of heart disease   -currently in the office today denies any active chest pain, palpitations, lightheadedness or dizziness, loss of consciousness, shortness of breath, lower extremity edema or other issue  She notes that most noticeable event that she had was last Sunday which lasted approximately 10 minutes in duration  -patient denies any tobacco or illicit drug use and has intermittent alcohol use approximately 1 glass of wine per week  -patient notes family history of heart disease with father with atrial fibrillation and paternal grandfather with cardiac disease but unclear diagnosis this time      Patient Active Problem List   Diagnosis   • Fibromyalgia   • Sjogren's syndrome with keratoconjunctivitis sicca (HCC)   • Chronic bilateral low back pain without sciatica   • Cough   • Costochondritis   • Gastroesophageal reflux disease without esophagitis   • Annual physical exam   • Dizziness   • Exposure to SARS-associated coronavirus   • Nausea   • Encounter for general adult medical examination with abnormal findings   • Dyslipidemia   • Other fatigue   • Depression, recurrent ([facility] [address] )   • Obstructive sleep apnea   • Encounter for immunization   • COVID-19 virus infection   • TMJ dysfunction   • Acanthosis nigricans   • Phonophobia   • Endometriosis   • Greater trochanteric bursitis of left hip   • Chest pain   • Palpitations     Past Medical History:   Diagnosis Date   • Arthritis     osteoarthritis   • Benign paroxysmal vertigo, unspecified ear    • Bilateral temporomandibular joint disorder, unspecified    • Depression    • Disease of thyroid gland     autoimmune thyroiditis   • Dizziness and giddiness    • Dyspnea    • Fibromyalgia    • Sicca syndrome, unspecified    • Sjogren's syndrome (HCC)    • Spondylolysis, lumbosacral    • Sprain of ligaments of cervical spine    • Viral intestinal infection, unspecified      Social History     Socioeconomic History   • Marital status: Single     Spouse name: Not on file   • Number of children: Not on file   • Years of education: Not on file   • Highest education level: Not on file   Occupational History   • Not on file   Tobacco Use   • Smoking status: Never Smoker   • Smokeless tobacco: Never Used   Vaping Use   • Vaping Use: Never used   Substance and Sexual Activity   • Alcohol use:  Yes   • Drug use: Never   • Sexual activity: Yes     Partners: Male   Other Topics Concern   • Not on file   Social History Narrative   • Not on file     Social Determinants of Health     Financial Resource Strain: Not on file   Food Insecurity: Not on file   Transportation Needs: Not on file   Physical Activity: Not on file   Stress: Not on file   Social Connections: Not on file   Intimate Partner Violence: Not on file   Housing Stability: Not on file      Family History   Problem Relation Age of Onset   • Arthritis Mother    • Atrial fibrillation Father    • Emphysema Father    • Lopez's esophagus Father    • JULIO disease Father    • Hypertension Father    • Diabetes Father    • Endometriosis Sister    • Fibromyalgia Brother    • Neuropathy Brother    • Anxiety disorder Brother    • Depression Brother    • JULIO disease Brother      Past Surgical History:   Procedure Laterality Date   • DENTAL SURGERY     • EXPLORATORY LAPAROTOMY         Current Outpatient Medications:   •  buPROPion (WELLBUTRIN XL) 150 mg 24 hr tablet, Take 150 mg by mouth every morning, Disp: , Rfl:   •  celecoxib (CeleBREX) 100 mg capsule, Take 1 capsule (100 mg total) by mouth 2 (two) times a day, Disp: 60 capsule, Rfl: 1  •  clonazePAM (KlonoPIN) 0 25 MG disintegrating tablet, DISSOLVE 1 TABLET IN MOUTH TWICE DAILY AS NEEDED FOR ANXIETY, Disp: 60 tablet, Rfl: 2  •  gabapentin (NEURONTIN) 300 mg capsule, TAKE 1 CAPSULE BY MOUTH THREE TIMES DAILY, Disp: 90 capsule, Rfl: 2  •  hydroxychloroquine (PLAQUENIL) 200 mg tablet, TAKE 2 TABLETS BY MOUTH ONCE DAILY WITH BREAKFAST, Disp: 180 tablet, Rfl: 0  •  levonorgestrel-ethinyl estradiol (AVIANE,ALESSE,LESSINA) 0 1-20 MG-MCG per tablet, , Disp: , Rfl:   •  meclizine (ANTIVERT) 25 mg tablet, Take 1 tablet (25 mg total) by mouth 3 (three) times a day, Disp: 90 tablet, Rfl: 0  •  Multiple Vitamins-Minerals (MULTIVITAMIN WOMEN PO), Take by mouth, Disp: , Rfl:   •  omeprazole (PriLOSEC) 40 MG capsule, Take 1 capsule by mouth once daily, Disp: 90 capsule, Rfl: 0  •  ondansetron (ZOFRAN-ODT) 4 mg disintegrating tablet, Take 1 tablet (4 mg total) by mouth every 6 (six) hours as needed for nausea or vomiting, Disp: 20 tablet, Rfl: 3  •  Restasis 0 05 % ophthalmic emulsion, Administer 1 drop to both eyes 2 (two) times a day, Disp: , Rfl:   •  sertraline (ZOLOFT) 100 mg tablet, Take 1 5 tablets (150 mg total) by mouth daily at bedtime, Disp: 135 tablet, Rfl: 3  Allergies   Allergen Reactions   • Amoxicillin Hives         Labs:  Telephone on 09/30/2022   Component Date Value   • Supplier Name 09/30/2022 [employer] [facility]    • Supplier Phone Number 09/30/2022 [phone]    • Order Status 09/30/2022 Delivery Successful    • Delivery Note 09/30/2022 DME set up 10/13/22 [place]    • Delivery Request Date 09/30/2022 09/30/2022    • Date Delivered  09/30/2022 10/13/2022    • Supplier Name 09/30/2022 10/13/2022    • Item Description 09/30/2022 CPAP Machine, Resmed S10 Auto-CPAP    • Item Description 09/30/2022 PAP Mask, Full Face, Fit Upon Setup, N/A, 1 per 3 months    • Item Description 09/30/2022 PAP Mask Interface Cushion, Full Face, 1 per 1 month    • Item Description 09/30/2022 PAP Headgear, 1 per 6 months    • Item Description 09/30/2022 PAP Humidifier, Heated    • Item Description 09/30/2022 Disposable PAP Filter, 2 per 1 month    • Item Description 09/30/2022 Non-Disposable PAP Filter, 1 per 6 months    • Item Description 09/30/2022 PAP Machine Tubing, Heated, 1 per 3 months    • Item Description 09/30/2022 PAP Monitoring Modem    • Item Description 09/30/2022 Humidifier Water Chamber, 1 per 6 months    • Item Description 09/30/2022 PAP Chinstrap, 1 per 6 months    Appointment on 09/14/2022   Component Date Value   • WBC 09/14/2022 8 24    • RBC 09/14/2022 4 95    • Hemoglobin 09/14/2022 14 8    • Hematocrit 09/14/2022 43 5    • MCV 09/14/2022 88    • MCH 09/14/2022 29 9    • MCHC 09/14/2022 34 0    • RDW 09/14/2022 13 3    • MPV 09/14/2022 10 0    • Platelets 68/99/1032 282    • nRBC 09/14/2022 0    • Neutrophils Relative 09/14/2022 56    • Immat GRANS % 09/14/2022 0    • Lymphocytes Relative 09/14/2022 33    • Monocytes Relative 09/14/2022 5    • Eosinophils Relative 09/14/2022 5    • Basophils Relative 09/14/2022 1    • Neutrophils Absolute 09/14/2022 4 64    • Immature Grans Absolute 09/14/2022 0 02    • Lymphocytes Absolute 09/14/2022 2 71    • Monocytes Absolute 09/14/2022 0 42    • Eosinophils Absolute 09/14/2022 0 41    • Basophils Absolute 09/14/2022 0 04    • Sodium 09/14/2022 136    • Potassium 09/14/2022 4 0    • Chloride 09/14/2022 106    • CO2 09/14/2022 26    • ANION GAP 09/14/2022 4    • BUN 09/14/2022 11    • Creatinine 09/14/2022 0 83    • Glucose, Fasting 2022 88    • Calcium 2022 9 2    • AST 2022 13    • ALT 2022 23    • Alkaline Phosphatase 2022 76    • Total Protein 2022 6 9    • Albumin 2022 3 5    • Total Bilirubin 2022 0 39    • eGFR 2022 92    • Sed Rate 2022 27 (A)   • CRP 2022 5 9 (A)        Imaging: Holter monitor    Result Date: 10/25/2022  Narrative: PT NAME: Raisa Madrigal : 1985  AGE: 30 y o  GENDER: female MRN: [id number]   PROCEDURE: Holter monitor INDICATIONS: Palpitations     Impression: The patient had predominantly sinus rhythm  Heart rate varied from 45 bpm to 145 bpm   The patient’s average heart rate was 71 bpm   The patient had a holter monitor tracing done for 23 hours and 59 minutes  The patient had 2 PVCs  The patient had 141 PACs  There were 2 couplets  The longest R/R interval was 1 6 seconds  No diary attached  Review of Systems:  Review of Systems   Constitutional: Negative for chills, diaphoresis, fatigue and fever  HENT: Negative for trouble swallowing and voice change  Eyes: Negative for pain and redness  Respiratory: Negative for shortness of breath and wheezing  Cardiovascular: Negative for chest pain, palpitations and leg swelling  Gastrointestinal: Negative for abdominal pain, constipation, diarrhea and nausea  Genitourinary: Negative for dysuria  Musculoskeletal: Negative for neck pain and neck stiffness  Skin: Negative for rash  Neurological: Negative for dizziness, syncope, light-headedness and headaches  Psychiatric/Behavioral: Negative for agitation and confusion  All other systems reviewed and are negative  Vitals:    22 1006   BP: 126/82   Pulse: 68   Weight: 124 kg (274 lb)   Height: 5' 7" (1 702 m)     Vitals:    22 1006   Weight: 124 kg (274 lb)     Height: 5' 7" (170 2 cm)     Physical Exam:  Physical Exam  Vitals reviewed  Constitutional:       General: She is not in acute distress  Appearance: She is obese  She is not diaphoretic  HENT:      Head: Normocephalic and atraumatic  Neck:      Trachea: No tracheal deviation  Comments: Neck obese, difficult to assess JVD  Cardiovascular:      Rate and Rhythm: Normal rate and regular rhythm  Heart sounds: No friction rub  Pulmonary:      Effort: Pulmonary effort is normal  No respiratory distress  Breath sounds: No wheezing or rhonchi  Abdominal:      General: Bowel sounds are normal       Palpations: Abdomen is soft  Tenderness: There is no abdominal tenderness  Musculoskeletal:      Right lower leg: No edema  Left lower leg: No edema  Skin:     General: Skin is warm and dry  Neurological:      Mental Status: She is alert  Comments: Awake, alert, able to answer questions appropriately, able move extremities bilaterally     Psychiatric:         Mood and Affect: Mood normal          Behavior: Behavior normal

## 2022-11-09 ENCOUNTER — TELEMEDICINE (OUTPATIENT)
Dept: RHEUMATOLOGY | Facility: CLINIC | Age: 37
End: 2022-11-09

## 2022-11-09 DIAGNOSIS — Z79.899 LONG-TERM USE OF PLAQUENIL: ICD-10-CM

## 2022-11-09 DIAGNOSIS — M35.00 SICCA SYNDROME (HCC): ICD-10-CM

## 2022-11-09 DIAGNOSIS — M35.01 SJOGREN'S SYNDROME WITH KERATOCONJUNCTIVITIS SICCA (HCC): Primary | ICD-10-CM

## 2022-11-09 DIAGNOSIS — M25.50 POLYARTHRALGIA: ICD-10-CM

## 2022-11-09 RX ORDER — CELECOXIB 200 MG/1
200 CAPSULE ORAL 2 TIMES DAILY PRN
Qty: 180 CAPSULE | Refills: 1 | Status: SHIPPED | OUTPATIENT
Start: 2022-11-09

## 2022-11-09 RX ORDER — HYDROXYCHLOROQUINE SULFATE 200 MG/1
400 TABLET, FILM COATED ORAL
Qty: 180 TABLET | Refills: 1 | Status: SHIPPED | OUTPATIENT
Start: 2022-11-09 | End: 2023-02-07

## 2024-05-09 NOTE — PATIENT INSTRUCTIONS
Call with any questions prior to next appointment  Quality 47: Advance Care Plan: Advance Care Planning discussed and documented; advance care plan or surrogate decision maker documented in the medical record. Name And Contact Information For Health Care Proxy: Donita Cao [phone] Quality 226: Preventive Care And Screening: Tobacco Use: Screening And Cessation Intervention: Patient screened for tobacco use and is an ex/non-smoker Detail Level: Detailed